# Patient Record
Sex: MALE | Race: WHITE | Employment: UNEMPLOYED | URBAN - METROPOLITAN AREA
[De-identification: names, ages, dates, MRNs, and addresses within clinical notes are randomized per-mention and may not be internally consistent; named-entity substitution may affect disease eponyms.]

---

## 2021-12-18 ENCOUNTER — HOSPITAL ENCOUNTER (INPATIENT)
Age: 56
LOS: 4 days | Discharge: HOME | DRG: 215 | End: 2021-12-23
Attending: EMERGENCY MEDICINE | Admitting: INTERNAL MEDICINE

## 2021-12-18 DIAGNOSIS — R11.2 NON-INTRACTABLE VOMITING WITH NAUSEA, UNSPECIFIED VOMITING TYPE: ICD-10-CM

## 2021-12-18 DIAGNOSIS — R06.02 SHORTNESS OF BREATH: ICD-10-CM

## 2021-12-18 DIAGNOSIS — I21.3 ST ELEVATION MYOCARDIAL INFARCTION (STEMI), UNSPECIFIED ARTERY (HCC): Primary | ICD-10-CM

## 2021-12-18 DIAGNOSIS — R10.13 ABDOMINAL PAIN, EPIGASTRIC: ICD-10-CM

## 2021-12-18 DIAGNOSIS — R07.9 CHEST PAIN, UNSPECIFIED TYPE: ICD-10-CM

## 2021-12-18 PROCEDURE — 99285 EMERGENCY DEPT VISIT HI MDM: CPT

## 2021-12-19 ENCOUNTER — APPOINTMENT (OUTPATIENT)
Dept: GENERAL RADIOLOGY | Age: 56
DRG: 215 | End: 2021-12-19

## 2021-12-19 PROBLEM — I21.3 ST ELEVATION MYOCARDIAL INFARCTION (STEMI) (HCC): Status: ACTIVE | Noted: 2021-12-19

## 2021-12-19 PROBLEM — I21.4 NSTEMI (NON-ST ELEVATED MYOCARDIAL INFARCTION) (HCC): Status: ACTIVE | Noted: 2021-12-19

## 2021-12-19 PROBLEM — R57.0 CARDIOGENIC SHOCK (HCC): Status: ACTIVE | Noted: 2021-12-19

## 2021-12-19 LAB
A/G RATIO: 1.1 (ref 1.1–2.2)
ALBUMIN SERPL-MCNC: 3.1 G/DL (ref 3.4–5)
ALP BLD-CCNC: 44 U/L (ref 40–129)
ALT SERPL-CCNC: 75 U/L (ref 10–40)
ANION GAP SERPL CALCULATED.3IONS-SCNC: 12 MMOL/L (ref 3–16)
ANION GAP SERPL CALCULATED.3IONS-SCNC: 17 MMOL/L (ref 3–16)
AST SERPL-CCNC: 239 U/L (ref 15–37)
BANDED NEUTROPHILS RELATIVE PERCENT: 2 % (ref 0–7)
BASOPHILS ABSOLUTE: 0 K/UL (ref 0–0.2)
BASOPHILS RELATIVE PERCENT: 0 %
BILIRUB SERPL-MCNC: 0.7 MG/DL (ref 0–1)
BUN BLDV-MCNC: 28 MG/DL (ref 7–20)
BUN BLDV-MCNC: 29 MG/DL (ref 7–20)
CALCIUM SERPL-MCNC: 8.1 MG/DL (ref 8.3–10.6)
CALCIUM SERPL-MCNC: 8.8 MG/DL (ref 8.3–10.6)
CHLORIDE BLD-SCNC: 93 MMOL/L (ref 99–110)
CHLORIDE BLD-SCNC: 97 MMOL/L (ref 99–110)
CO2: 24 MMOL/L (ref 21–32)
CO2: 26 MMOL/L (ref 21–32)
CREAT SERPL-MCNC: 1.2 MG/DL (ref 0.9–1.3)
CREAT SERPL-MCNC: 1.3 MG/DL (ref 0.9–1.3)
EKG ATRIAL RATE: 53 BPM
EKG DIAGNOSIS: NORMAL
EKG P AXIS: 58 DEGREES
EKG Q-T INTERVAL: 544 MS
EKG QRS DURATION: 150 MS
EKG QTC CALCULATION (BAZETT): 510 MS
EKG R AXIS: 196 DEGREES
EKG T AXIS: 79 DEGREES
EKG VENTRICULAR RATE: 53 BPM
EOSINOPHILS ABSOLUTE: 0 K/UL (ref 0–0.6)
EOSINOPHILS RELATIVE PERCENT: 0 %
GFR AFRICAN AMERICAN: >60
GFR AFRICAN AMERICAN: >60
GFR NON-AFRICAN AMERICAN: 57
GFR NON-AFRICAN AMERICAN: >60
GLUCOSE BLD-MCNC: 126 MG/DL (ref 70–99)
GLUCOSE BLD-MCNC: 131 MG/DL (ref 70–99)
HCT VFR BLD CALC: 37.9 % (ref 40.5–52.5)
HCT VFR BLD CALC: 43.1 % (ref 40.5–52.5)
HEMATOLOGY PATH CONSULT: YES
HEMOGLOBIN: 12.8 G/DL (ref 13.5–17.5)
HEMOGLOBIN: 14.5 G/DL (ref 13.5–17.5)
LACTIC ACID, SEPSIS: 1.3 MMOL/L (ref 0.4–1.9)
LACTIC ACID, SEPSIS: 1.5 MMOL/L (ref 0.4–1.9)
LACTIC ACID: 1.1 MMOL/L (ref 0.4–2)
LYMPHOCYTES ABSOLUTE: 1 K/UL (ref 1–5.1)
LYMPHOCYTES RELATIVE PERCENT: 5 %
MAGNESIUM: 2.3 MG/DL (ref 1.8–2.4)
MCH RBC QN AUTO: 31.2 PG (ref 26–34)
MCH RBC QN AUTO: 31.5 PG (ref 26–34)
MCHC RBC AUTO-ENTMCNC: 33.7 G/DL (ref 31–36)
MCHC RBC AUTO-ENTMCNC: 33.7 G/DL (ref 31–36)
MCV RBC AUTO: 92.7 FL (ref 80–100)
MCV RBC AUTO: 93.4 FL (ref 80–100)
MONOCYTES ABSOLUTE: 2.5 K/UL (ref 0–1.3)
MONOCYTES RELATIVE PERCENT: 13 %
NEUTROPHILS ABSOLUTE: 15.9 K/UL (ref 1.7–7.7)
NEUTROPHILS RELATIVE PERCENT: 80 %
PDW BLD-RTO: 14 % (ref 12.4–15.4)
PDW BLD-RTO: 14 % (ref 12.4–15.4)
PHOSPHORUS: 3.1 MG/DL (ref 2.5–4.9)
PLATELET # BLD: 214 K/UL (ref 135–450)
PLATELET # BLD: 254 K/UL (ref 135–450)
PLATELET SLIDE REVIEW: ADEQUATE
PMV BLD AUTO: 8.2 FL (ref 5–10.5)
PMV BLD AUTO: 8.7 FL (ref 5–10.5)
POC ACT LR: 145 SEC
POC ACT LR: 145 SEC
POC ACT LR: 147 SEC
POC ACT LR: 149 SEC
POC ACT LR: 150 SEC
POC ACT LR: 150 SEC
POC ACT LR: 155 SEC
POC ACT LR: 157 SEC
POC ACT LR: 159 SEC
POC ACT LR: 159 SEC
POC ACT LR: 160 SEC
POC ACT LR: 164 SEC
POC ACT LR: 167 SEC
POC ACT LR: 167 SEC
POC ACT LR: 175 SEC
POC ACT LR: 178 SEC
POC ACT LR: 188 SEC
POC ACT LR: 300 SEC
POTASSIUM REFLEX MAGNESIUM: 4.2 MMOL/L (ref 3.5–5.1)
POTASSIUM SERPL-SCNC: 3.5 MMOL/L (ref 3.5–5.1)
PRO-BNP: 3039 PG/ML (ref 0–124)
RBC # BLD: 4.05 M/UL (ref 4.2–5.9)
RBC # BLD: 4.66 M/UL (ref 4.2–5.9)
SARS-COV-2, NAAT: NOT DETECTED
SODIUM BLD-SCNC: 134 MMOL/L (ref 136–145)
SODIUM BLD-SCNC: 135 MMOL/L (ref 136–145)
TOTAL PROTEIN: 5.8 G/DL (ref 6.4–8.2)
TROPONIN: 3.17 NG/ML
TROPONIN: 4.36 NG/ML
TROPONIN: 5.48 NG/ML
WBC # BLD: 16.9 K/UL (ref 4–11)
WBC # BLD: 19.4 K/UL (ref 4–11)

## 2021-12-19 PROCEDURE — 96374 THER/PROPH/DIAG INJ IV PUSH: CPT

## 2021-12-19 PROCEDURE — C1894 INTRO/SHEATH, NON-LASER: HCPCS

## 2021-12-19 PROCEDURE — 36415 COLL VENOUS BLD VENIPUNCTURE: CPT

## 2021-12-19 PROCEDURE — 99152 MOD SED SAME PHYS/QHP 5/>YRS: CPT | Performed by: INTERNAL MEDICINE

## 2021-12-19 PROCEDURE — 6360000002 HC RX W HCPCS: Performed by: EMERGENCY MEDICINE

## 2021-12-19 PROCEDURE — 83880 ASSAY OF NATRIURETIC PEPTIDE: CPT

## 2021-12-19 PROCEDURE — C1887 CATHETER, GUIDING: HCPCS

## 2021-12-19 PROCEDURE — 2709999900 HC NON-CHARGEABLE SUPPLY

## 2021-12-19 PROCEDURE — 5A1223Z PERFORMANCE OF CARDIAC PACING, CONTINUOUS: ICD-10-PCS | Performed by: INTERNAL MEDICINE

## 2021-12-19 PROCEDURE — 80053 COMPREHEN METABOLIC PANEL: CPT

## 2021-12-19 PROCEDURE — 93005 ELECTROCARDIOGRAM TRACING: CPT | Performed by: EMERGENCY MEDICINE

## 2021-12-19 PROCEDURE — 33967 INSERT I-AORT PERCUT DEVICE: CPT

## 2021-12-19 PROCEDURE — C1889 IMPLANT/INSERT DEVICE, NOC: HCPCS

## 2021-12-19 PROCEDURE — 2580000003 HC RX 258: Performed by: INTERNAL MEDICINE

## 2021-12-19 PROCEDURE — 6360000004 HC RX CONTRAST MEDICATION: Performed by: INTERNAL MEDICINE

## 2021-12-19 PROCEDURE — 2100000000 HC CCU R&B

## 2021-12-19 PROCEDURE — 93458 L HRT ARTERY/VENTRICLE ANGIO: CPT | Performed by: INTERNAL MEDICINE

## 2021-12-19 PROCEDURE — 4A023N7 MEASUREMENT OF CARDIAC SAMPLING AND PRESSURE, LEFT HEART, PERCUTANEOUS APPROACH: ICD-10-PCS | Performed by: INTERNAL MEDICINE

## 2021-12-19 PROCEDURE — 94761 N-INVAS EAR/PLS OXIMETRY MLT: CPT

## 2021-12-19 PROCEDURE — 84100 ASSAY OF PHOSPHORUS: CPT

## 2021-12-19 PROCEDURE — 33990 INSJ PERQ VAD L HRT ARTERIAL: CPT | Performed by: INTERNAL MEDICINE

## 2021-12-19 PROCEDURE — 71045 X-RAY EXAM CHEST 1 VIEW: CPT

## 2021-12-19 PROCEDURE — 6370000000 HC RX 637 (ALT 250 FOR IP): Performed by: EMERGENCY MEDICINE

## 2021-12-19 PROCEDURE — 85027 COMPLETE CBC AUTOMATED: CPT

## 2021-12-19 PROCEDURE — 83605 ASSAY OF LACTIC ACID: CPT

## 2021-12-19 PROCEDURE — 51702 INSERT TEMP BLADDER CATH: CPT

## 2021-12-19 PROCEDURE — B2111ZZ FLUOROSCOPY OF MULTIPLE CORONARY ARTERIES USING LOW OSMOLAR CONTRAST: ICD-10-PCS | Performed by: INTERNAL MEDICINE

## 2021-12-19 PROCEDURE — 92941 PRQ TRLML REVSC TOT OCCL AMI: CPT | Performed by: INTERNAL MEDICINE

## 2021-12-19 PROCEDURE — 2720000010 HC SURG SUPPLY STERILE

## 2021-12-19 PROCEDURE — 2500000003 HC RX 250 WO HCPCS

## 2021-12-19 PROCEDURE — 33990 INSJ PERQ VAD L HRT ARTERIAL: CPT

## 2021-12-19 PROCEDURE — B2151ZZ FLUOROSCOPY OF LEFT HEART USING LOW OSMOLAR CONTRAST: ICD-10-PCS | Performed by: INTERNAL MEDICINE

## 2021-12-19 PROCEDURE — 93458 L HRT ARTERY/VENTRICLE ANGIO: CPT

## 2021-12-19 PROCEDURE — 99291 CRITICAL CARE FIRST HOUR: CPT | Performed by: INTERNAL MEDICINE

## 2021-12-19 PROCEDURE — 93010 ELECTROCARDIOGRAM REPORT: CPT | Performed by: INTERNAL MEDICINE

## 2021-12-19 PROCEDURE — 93005 ELECTROCARDIOGRAM TRACING: CPT | Performed by: INTERNAL MEDICINE

## 2021-12-19 PROCEDURE — 85347 COAGULATION TIME ACTIVATED: CPT

## 2021-12-19 PROCEDURE — 83735 ASSAY OF MAGNESIUM: CPT

## 2021-12-19 PROCEDURE — 6360000002 HC RX W HCPCS

## 2021-12-19 PROCEDURE — 6360000002 HC RX W HCPCS: Performed by: INTERNAL MEDICINE

## 2021-12-19 PROCEDURE — 33974 REMOVE INTRA-AORTIC BALLOON: CPT | Performed by: INTERNAL MEDICINE

## 2021-12-19 PROCEDURE — 84484 ASSAY OF TROPONIN QUANT: CPT

## 2021-12-19 PROCEDURE — 99152 MOD SED SAME PHYS/QHP 5/>YRS: CPT

## 2021-12-19 PROCEDURE — 5A0221D ASSISTANCE WITH CARDIAC OUTPUT USING IMPELLER PUMP, CONTINUOUS: ICD-10-PCS | Performed by: INTERNAL MEDICINE

## 2021-12-19 PROCEDURE — 87040 BLOOD CULTURE FOR BACTERIA: CPT

## 2021-12-19 PROCEDURE — 92973 PRQ TRLUML C MCHN ASP THRMBC: CPT

## 2021-12-19 PROCEDURE — 02HA3RZ INSERTION OF SHORT-TERM EXTERNAL HEART ASSIST SYSTEM INTO HEART, PERCUTANEOUS APPROACH: ICD-10-PCS | Performed by: INTERNAL MEDICINE

## 2021-12-19 PROCEDURE — 85025 COMPLETE CBC W/AUTO DIFF WBC: CPT

## 2021-12-19 PROCEDURE — 87635 SARS-COV-2 COVID-19 AMP PRB: CPT

## 2021-12-19 PROCEDURE — 33973 INSERT BALLOON DEVICE: CPT | Performed by: INTERNAL MEDICINE

## 2021-12-19 PROCEDURE — 2580000003 HC RX 258: Performed by: EMERGENCY MEDICINE

## 2021-12-19 PROCEDURE — C1769 GUIDE WIRE: HCPCS

## 2021-12-19 PROCEDURE — 2500000003 HC RX 250 WO HCPCS: Performed by: INTERNAL MEDICINE

## 2021-12-19 PROCEDURE — C1725 CATH, TRANSLUMIN NON-LASER: HCPCS

## 2021-12-19 PROCEDURE — 93308 TTE F-UP OR LMTD: CPT

## 2021-12-19 PROCEDURE — 92941 PRQ TRLML REVSC TOT OCCL AMI: CPT

## 2021-12-19 PROCEDURE — 6370000000 HC RX 637 (ALT 250 FOR IP): Performed by: INTERNAL MEDICINE

## 2021-12-19 PROCEDURE — 96361 HYDRATE IV INFUSION ADD-ON: CPT

## 2021-12-19 PROCEDURE — 99153 MOD SED SAME PHYS/QHP EA: CPT

## 2021-12-19 RX ORDER — SODIUM CHLORIDE 9 MG/ML
25 INJECTION, SOLUTION INTRAVENOUS PRN
Status: DISCONTINUED | OUTPATIENT
Start: 2021-12-19 | End: 2021-12-21

## 2021-12-19 RX ORDER — LORAZEPAM 2 MG/ML
1 INJECTION INTRAMUSCULAR EVERY 4 HOURS PRN
Status: DISCONTINUED | OUTPATIENT
Start: 2021-12-19 | End: 2021-12-23 | Stop reason: HOSPADM

## 2021-12-19 RX ORDER — SODIUM CHLORIDE 9 MG/ML
INJECTION, SOLUTION INTRAVENOUS CONTINUOUS
Status: DISCONTINUED | OUTPATIENT
Start: 2021-12-19 | End: 2021-12-23 | Stop reason: HOSPADM

## 2021-12-19 RX ORDER — 0.9 % SODIUM CHLORIDE 0.9 %
500 INTRAVENOUS SOLUTION INTRAVENOUS ONCE
Status: COMPLETED | OUTPATIENT
Start: 2021-12-19 | End: 2021-12-19

## 2021-12-19 RX ORDER — ASPIRIN 81 MG/1
324 TABLET, CHEWABLE ORAL ONCE
Status: COMPLETED | OUTPATIENT
Start: 2021-12-19 | End: 2021-12-19

## 2021-12-19 RX ORDER — 0.9 % SODIUM CHLORIDE 0.9 %
1000 INTRAVENOUS SOLUTION INTRAVENOUS ONCE
Status: COMPLETED | OUTPATIENT
Start: 2021-12-19 | End: 2021-12-19

## 2021-12-19 RX ORDER — HEPARIN SODIUM 1000 [USP'U]/ML
1000 INJECTION, SOLUTION INTRAVENOUS; SUBCUTANEOUS ONCE
Status: COMPLETED | OUTPATIENT
Start: 2021-12-19 | End: 2021-12-19

## 2021-12-19 RX ORDER — HYDROCODONE BITARTRATE AND ACETAMINOPHEN 5; 325 MG/1; MG/1
2 TABLET ORAL EVERY 4 HOURS PRN
Status: DISCONTINUED | OUTPATIENT
Start: 2021-12-19 | End: 2021-12-23 | Stop reason: HOSPADM

## 2021-12-19 RX ORDER — HEPARIN SODIUM 10000 [USP'U]/100ML
0-3000 INJECTION, SOLUTION INTRAVENOUS CONTINUOUS
Status: DISCONTINUED | OUTPATIENT
Start: 2021-12-19 | End: 2021-12-22

## 2021-12-19 RX ORDER — HEPARIN SODIUM 1000 [USP'U]/ML
6000 INJECTION, SOLUTION INTRAVENOUS; SUBCUTANEOUS ONCE
Status: COMPLETED | OUTPATIENT
Start: 2021-12-19 | End: 2021-12-19

## 2021-12-19 RX ORDER — MORPHINE SULFATE 4 MG/ML
4 INJECTION, SOLUTION INTRAMUSCULAR; INTRAVENOUS
Status: DISCONTINUED | OUTPATIENT
Start: 2021-12-19 | End: 2021-12-23 | Stop reason: HOSPADM

## 2021-12-19 RX ORDER — SODIUM CHLORIDE 0.9 % (FLUSH) 0.9 %
5-40 SYRINGE (ML) INJECTION PRN
Status: DISCONTINUED | OUTPATIENT
Start: 2021-12-19 | End: 2021-12-21

## 2021-12-19 RX ORDER — SODIUM CHLORIDE 0.9 % (FLUSH) 0.9 %
5-40 SYRINGE (ML) INJECTION EVERY 12 HOURS SCHEDULED
Status: DISCONTINUED | OUTPATIENT
Start: 2021-12-19 | End: 2021-12-21

## 2021-12-19 RX ORDER — ONDANSETRON 2 MG/ML
4 INJECTION INTRAMUSCULAR; INTRAVENOUS EVERY 6 HOURS PRN
Status: DISCONTINUED | OUTPATIENT
Start: 2021-12-19 | End: 2021-12-23 | Stop reason: HOSPADM

## 2021-12-19 RX ORDER — ACETAMINOPHEN 325 MG/1
650 TABLET ORAL EVERY 4 HOURS PRN
Status: DISCONTINUED | OUTPATIENT
Start: 2021-12-19 | End: 2021-12-21

## 2021-12-19 RX ORDER — LISINOPRIL 20 MG/1
20 TABLET ORAL DAILY
Status: ON HOLD | COMMUNITY
End: 2021-12-23 | Stop reason: HOSPADM

## 2021-12-19 RX ORDER — MORPHINE SULFATE 2 MG/ML
2 INJECTION, SOLUTION INTRAMUSCULAR; INTRAVENOUS
Status: ACTIVE | OUTPATIENT
Start: 2021-12-19 | End: 2021-12-19

## 2021-12-19 RX ORDER — MIDAZOLAM HYDROCHLORIDE 1 MG/ML
2 INJECTION INTRAMUSCULAR; INTRAVENOUS
Status: ACTIVE | OUTPATIENT
Start: 2021-12-19 | End: 2021-12-19

## 2021-12-19 RX ORDER — EPTIFIBATIDE 0.75 MG/ML
2 INJECTION, SOLUTION INTRAVENOUS CONTINUOUS
Status: DISPENSED | OUTPATIENT
Start: 2021-12-19 | End: 2021-12-20

## 2021-12-19 RX ORDER — ATROPINE SULFATE 0.4 MG/ML
0.5 AMPUL (ML) INJECTION
Status: ACTIVE | OUTPATIENT
Start: 2021-12-19 | End: 2021-12-19

## 2021-12-19 RX ORDER — 0.9 % SODIUM CHLORIDE 0.9 %
500 INTRAVENOUS SOLUTION INTRAVENOUS PRN
Status: DISCONTINUED | OUTPATIENT
Start: 2021-12-19 | End: 2021-12-23 | Stop reason: HOSPADM

## 2021-12-19 RX ORDER — ATORVASTATIN CALCIUM 40 MG/1
40 TABLET, FILM COATED ORAL NIGHTLY
Status: DISCONTINUED | OUTPATIENT
Start: 2021-12-19 | End: 2021-12-23 | Stop reason: HOSPADM

## 2021-12-19 RX ORDER — POTASSIUM CHLORIDE 750 MG/1
40 TABLET, FILM COATED, EXTENDED RELEASE ORAL 2 TIMES DAILY
Status: DISCONTINUED | OUTPATIENT
Start: 2021-12-19 | End: 2021-12-23 | Stop reason: HOSPADM

## 2021-12-19 RX ORDER — HYDROCODONE BITARTRATE AND ACETAMINOPHEN 5; 325 MG/1; MG/1
1 TABLET ORAL EVERY 4 HOURS PRN
Status: DISCONTINUED | OUTPATIENT
Start: 2021-12-19 | End: 2021-12-23 | Stop reason: HOSPADM

## 2021-12-19 RX ORDER — HYDROCHLOROTHIAZIDE 25 MG/1
25 TABLET ORAL DAILY
Status: ON HOLD | COMMUNITY
End: 2021-12-23 | Stop reason: HOSPADM

## 2021-12-19 RX ORDER — EPTIFIBATIDE 0.75 MG/ML
2 INJECTION, SOLUTION INTRAVENOUS CONTINUOUS
Status: DISPENSED | OUTPATIENT
Start: 2021-12-19 | End: 2021-12-19

## 2021-12-19 RX ORDER — ASPIRIN 81 MG/1
81 TABLET, CHEWABLE ORAL DAILY
Status: DISCONTINUED | OUTPATIENT
Start: 2021-12-20 | End: 2021-12-23 | Stop reason: HOSPADM

## 2021-12-19 RX ORDER — FENTANYL CITRATE 50 UG/ML
25 INJECTION, SOLUTION INTRAMUSCULAR; INTRAVENOUS
Status: ACTIVE | OUTPATIENT
Start: 2021-12-19 | End: 2021-12-19

## 2021-12-19 RX ADMIN — HYDROCODONE BITARTRATE AND ACETAMINOPHEN 2 TABLET: 5; 325 TABLET ORAL at 18:35

## 2021-12-19 RX ADMIN — SODIUM CHLORIDE: 9 INJECTION, SOLUTION INTRAVENOUS at 03:45

## 2021-12-19 RX ADMIN — ATORVASTATIN CALCIUM 40 MG: 40 TABLET, FILM COATED ORAL at 20:32

## 2021-12-19 RX ADMIN — TICAGRELOR 90 MG: 90 TABLET ORAL at 15:24

## 2021-12-19 RX ADMIN — EPTIFIBATIDE 2 MCG/KG/MIN: 75 INJECTION INTRAVENOUS at 22:57

## 2021-12-19 RX ADMIN — LORAZEPAM 1 MG: 2 INJECTION INTRAMUSCULAR; INTRAVENOUS at 23:04

## 2021-12-19 RX ADMIN — Medication 10 ML: at 20:30

## 2021-12-19 RX ADMIN — POTASSIUM CHLORIDE 40 MEQ: 750 TABLET, FILM COATED, EXTENDED RELEASE ORAL at 15:24

## 2021-12-19 RX ADMIN — Medication 5 MCG/MIN: at 03:50

## 2021-12-19 RX ADMIN — HEPARIN SODIUM: 5000 INJECTION INTRAVENOUS; SUBCUTANEOUS at 04:26

## 2021-12-19 RX ADMIN — HEPARIN SODIUM: 5000 INJECTION INTRAVENOUS; SUBCUTANEOUS at 17:33

## 2021-12-19 RX ADMIN — MORPHINE SULFATE 4 MG: 4 INJECTION, SOLUTION INTRAMUSCULAR; INTRAVENOUS at 07:40

## 2021-12-19 RX ADMIN — TICAGRELOR 90 MG: 90 TABLET ORAL at 20:33

## 2021-12-19 RX ADMIN — ASPIRIN 81 MG 324 MG: 81 TABLET ORAL at 00:48

## 2021-12-19 RX ADMIN — LORAZEPAM 1 MG: 2 INJECTION INTRAMUSCULAR; INTRAVENOUS at 04:16

## 2021-12-19 RX ADMIN — Medication 10 ML: at 11:21

## 2021-12-19 RX ADMIN — EPTIFIBATIDE 2 MCG/KG/MIN: 75 INJECTION INTRAVENOUS at 06:36

## 2021-12-19 RX ADMIN — HYDROCODONE BITARTRATE AND ACETAMINOPHEN 2 TABLET: 5; 325 TABLET ORAL at 09:14

## 2021-12-19 RX ADMIN — EPTIFIBATIDE 2 MCG/KG/MIN: 75 INJECTION INTRAVENOUS at 03:47

## 2021-12-19 RX ADMIN — MORPHINE SULFATE 4 MG: 4 INJECTION, SOLUTION INTRAMUSCULAR; INTRAVENOUS at 21:05

## 2021-12-19 RX ADMIN — SODIUM CHLORIDE 1000 ML: 9 INJECTION, SOLUTION INTRAVENOUS at 00:21

## 2021-12-19 RX ADMIN — MORPHINE SULFATE 4 MG: 4 INJECTION, SOLUTION INTRAMUSCULAR; INTRAVENOUS at 19:02

## 2021-12-19 RX ADMIN — SODIUM CHLORIDE 500 ML: 9 INJECTION, SOLUTION INTRAVENOUS at 03:15

## 2021-12-19 RX ADMIN — HEPARIN SODIUM 1000 UNITS: 1000 INJECTION INTRAVENOUS; SUBCUTANEOUS at 06:10

## 2021-12-19 RX ADMIN — MORPHINE SULFATE 4 MG: 4 INJECTION, SOLUTION INTRAMUSCULAR; INTRAVENOUS at 11:21

## 2021-12-19 RX ADMIN — HEPARIN SODIUM 300 UNITS/HR: 5000 INJECTION INTRAVENOUS; SUBCUTANEOUS at 05:18

## 2021-12-19 RX ADMIN — IOPAMIDOL 65 ML: 755 INJECTION, SOLUTION INTRAVENOUS at 02:29

## 2021-12-19 RX ADMIN — HYDROCODONE BITARTRATE AND ACETAMINOPHEN 2 TABLET: 5; 325 TABLET ORAL at 14:38

## 2021-12-19 RX ADMIN — POTASSIUM CHLORIDE 40 MEQ: 750 TABLET, FILM COATED, EXTENDED RELEASE ORAL at 20:32

## 2021-12-19 RX ADMIN — SODIUM CHLORIDE: 9 INJECTION, SOLUTION INTRAVENOUS at 18:36

## 2021-12-19 RX ADMIN — HEPARIN SODIUM 6000 UNITS: 1000 INJECTION INTRAVENOUS; SUBCUTANEOUS at 00:45

## 2021-12-19 RX ADMIN — MORPHINE SULFATE 4 MG: 4 INJECTION, SOLUTION INTRAMUSCULAR; INTRAVENOUS at 13:34

## 2021-12-19 RX ADMIN — SODIUM CHLORIDE: 9 INJECTION, SOLUTION INTRAVENOUS at 22:49

## 2021-12-19 RX ADMIN — SODIUM CHLORIDE: 9 INJECTION, SOLUTION INTRAVENOUS at 06:34

## 2021-12-19 RX ADMIN — MORPHINE SULFATE 4 MG: 4 INJECTION, SOLUTION INTRAMUSCULAR; INTRAVENOUS at 03:40

## 2021-12-19 ASSESSMENT — PAIN DESCRIPTION - DESCRIPTORS
DESCRIPTORS: ACHING
DESCRIPTORS: ACHING
DESCRIPTORS: ACHING;RADIATING

## 2021-12-19 ASSESSMENT — PAIN SCALES - GENERAL
PAINLEVEL_OUTOF10: 8
PAINLEVEL_OUTOF10: 10
PAINLEVEL_OUTOF10: 8
PAINLEVEL_OUTOF10: 6
PAINLEVEL_OUTOF10: 8
PAINLEVEL_OUTOF10: 9
PAINLEVEL_OUTOF10: 10
PAINLEVEL_OUTOF10: 6
PAINLEVEL_OUTOF10: 10
PAINLEVEL_OUTOF10: 7
PAINLEVEL_OUTOF10: 10
PAINLEVEL_OUTOF10: 7
PAINLEVEL_OUTOF10: 8
PAINLEVEL_OUTOF10: 7
PAINLEVEL_OUTOF10: 8

## 2021-12-19 ASSESSMENT — PAIN DESCRIPTION - PAIN TYPE
TYPE: ACUTE PAIN

## 2021-12-19 ASSESSMENT — PAIN DESCRIPTION - ONSET
ONSET: ON-GOING
ONSET: ON-GOING

## 2021-12-19 ASSESSMENT — PAIN - FUNCTIONAL ASSESSMENT
PAIN_FUNCTIONAL_ASSESSMENT: PREVENTS OR INTERFERES WITH ALL ACTIVE AND SOME PASSIVE ACTIVITIES
PAIN_FUNCTIONAL_ASSESSMENT: ACTIVITIES ARE NOT PREVENTED

## 2021-12-19 ASSESSMENT — PAIN DESCRIPTION - LOCATION
LOCATION: BACK
LOCATION: CHEST;SHOULDER;BACK
LOCATION: BACK

## 2021-12-19 ASSESSMENT — PAIN DESCRIPTION - PROGRESSION
CLINICAL_PROGRESSION: GRADUALLY WORSENING
CLINICAL_PROGRESSION: NOT CHANGED

## 2021-12-19 ASSESSMENT — PAIN DESCRIPTION - FREQUENCY
FREQUENCY: CONTINUOUS
FREQUENCY: CONTINUOUS

## 2021-12-19 ASSESSMENT — PAIN DESCRIPTION - ORIENTATION: ORIENTATION: LEFT;RIGHT;ANTERIOR;MID

## 2021-12-19 NOTE — PROGRESS NOTES
Clinical Pharmacy Note  Heparin Dosing - Impella Device    Patient receiving heparin purge solution via Impella device. Consult received from Dr. Christopher Earl to titrate systemic heparin to maintain -180 . Shift ACT Results and Heparin Adjustments:      Date   Time POC ACT  Result Heparin  Bolus   (units) Systemic Heparin Infusion Rate (100 units/mL)   12/19/21   0800     178                           450 units/hr       0900     167       450 units/hr      1000     155       500 units/hr       1100     167       500 units/hr      1300     157        500 units/hr      1400     150       550 units/hr       Pharmacy will continue to monitor and adjust based on ACT results.     Helene Mccallum, PharmD, BCPS  12/19/2021  2:22 PM

## 2021-12-19 NOTE — PROGRESS NOTES
5am ACT was 164. Discussed with Barb Guardian in pharmacy and systemic heparin started  at 300 unit per hr.   Will continue hourly ACTs

## 2021-12-19 NOTE — ED NOTES
Bed: E-40  Expected date: 12/18/21  Expected time: 11:49 PM  Means of arrival: [facility]  Comments:  1140 \[facility] [place] [facility]\""  12/18/21 6933

## 2021-12-19 NOTE — PROGRESS NOTES
No response from Dr. Chhaya Collado regarding plan for patient for today, or request for swan. Message resent. Awaiting response.

## 2021-12-19 NOTE — PROGRESS NOTES
Return call from Dr. Luis Alva. Instructed to half rate of integrillin today, for plan of removal of IABP. States he plans to place swan, and will be in the hospital at around 12 pm today. Also instructed to lower the rate of the pacemaker to 80.

## 2021-12-19 NOTE — PROGRESS NOTES
Call placed to [facility] representative per request of Dr. Elizabet Zabala, to see if echo is needed today. josiah Garcia states yes. Echo tech on call notified. States she will be here in an hour. Josaih rep states he will be in the building in an hour.

## 2021-12-19 NOTE — PROGRESS NOTES
Late entry due to providing care: At 1200, Dr. Elizabet Zabala was bedside to speak with the family and this RN was instructed to prepare for IABP pull. Was unable to obtain ACT and impella numbers at this time. Will obtain at 1300. At 1225, IABP was removed by Dr. Elizabet Zabala. Manual pressure was assumed by this RN, with second RN checking pulses every 5 minutes. At 1255, manual hold of pressure over balloon pump site discontinued. Site soft. Scant oozing. Will continue to monitor per protocol. At 1330, Impella representative Jose at bedside; dressing over impella site changed with Impella Rep. Site no longer appears to be oozing. Will monitor. At 1429, instructed to return integrillin to former dose.

## 2021-12-19 NOTE — ED TRIAGE NOTES
Patient states he has not been feeling well for days, states he has had constant chest pain and SOB. States the chest pain is in his mid chest and radiates to his back and up to his shoulder. States he has sharp pain with inspiration. Patient states he tested negative for Covid yesterday.

## 2021-12-19 NOTE — PRE SEDATION
Sedation Pre-Procedure Note    Patient Name: Balwinder Mosquera   YOB: 1965  Room/Bed: P5M-8628/1305-01  Medical Record Number: [id number]  Date: 12/19/2021   Time: 2:58 AM       Indication: Cardiogenic shock/complete heart block    Consent: 21-year-old patient who presented 24 hours after severe prolonged chest pain associate with nausea vomiting diaphoresis. His EKG shows complete heart block and a completed AB. And anterolateral apical infarct    Vital Signs:   Vitals:    12/19/21 0102   BP: (!) 86/50   Pulse: 56   Resp: 18   Temp:    SpO2: 100%       Past Medical History:   has a past medical history of Hypertension. Past Surgical History:   has no past surgical history on file. Medications:   Scheduled Meds:   Continuous Infusions:   PRN Meds: LORazepam  Home Meds:   Prior to Admission medications    Medication Sig Start Date End Date Taking? Authorizing Provider   hydroCHLOROthiazide (HYDRODIURIL) 25 MG tablet Take 25 mg by mouth daily   Yes Historical Provider, MD   lisinopril (PRINIVIL;ZESTRIL) 20 MG tablet Take 20 mg by mouth daily   Yes Historical Provider, MD     Coumadin Use Last 7 Days:  no  Antiplatelet drug therapy use last 7 days: no  Other anticoagulant use last 7 days: no  Additional Medication Information:        Pre-Sedation Documentation and Exam:   I have personally completed a history, physical exam & review of systems for this patient (see notes).     Mallampati Airway Assessment:  normal    Prior History of Anesthesia Complications:   none    ASA Classification:  Class 1 - A normal healthy patient    Sedation/ Anesthesia Plan:   intravenous sedation    Medications Planned:   midazolam (Versed) intravenously and fentanyl intravenously    Patient is an appropriate candidate for plan of sedation: yes    Electronically signed by Leo Shin MD on 12/19/2021 at 2:58 AM

## 2021-12-19 NOTE — ED NOTES
Report given to [facility] in the cathlab, pt transported without incident     Miah Benítez RN  12/19/21 0127

## 2021-12-19 NOTE — PROGRESS NOTES
Message sent to Dr. Haresh Saldivar to inquire about swan placement today and plan of care. Awaiting response.

## 2021-12-19 NOTE — PROGRESS NOTES
Order for integrillin technically ended. Per Dr. Bashir Nagy note, states to continue it until other wise told to stop medication. Order clarified with physician.

## 2021-12-19 NOTE — ED PROVIDER NOTES
CHIEF COMPLAINT  Shortness of Breath (sharp pain with breath) and Chest Pain (Pain 10/10 for days)      HISTORY OF PRESENT ILLNESS  Balwinder Burciaga is a 55 y.o. male who presents to the ED complaining of epigastric and substernal chest pain has been present since yesterday. Patient states he is also had nausea and vomiting that started yesterday. States pain is 10 out of 10 and gets worse with deep breathing. Denies any relieving factors. States he has not had any nausea or vomiting for the past 4 hours. States he does feel short of breath. No previous cardiac history. No recent travel. States he was seen in urgent care yesterday for the nausea and vomiting and treated with a dose of IM Phenergan and was discharged. No tobacco use. No illicit drug use. No alcohol use. .   No other complaints, modifying factors or associated symptoms. Nursing notes reviewed. Denies any past medical history  Denies any past surgical history  Denies any pertinent family history  Social History     Socioeconomic History    Marital status: Not on file     Spouse name: Not on file    Number of children: Not on file    Years of education: Not on file    Highest education level: Not on file   Occupational History    Not on file   Tobacco Use    Smoking status: Not on file    Smokeless tobacco: Not on file   Substance and Sexual Activity    Alcohol use: Not on file    Drug use: Not on file    Sexual activity: Not on file   Other Topics Concern    Not on file   Social History Narrative    Not on file     Social Determinants of Health     Financial Resource Strain:     Difficulty of Paying Living Expenses: Not on file   Food Insecurity:     Worried About Running Out of Food in the Last Year: Not on file    Cecelia of Food in the Last Year: Not on file   Transportation Needs:     Lack of Transportation (Medical): Not on file    Lack of Transportation (Non-Medical):  Not on file   Physical Activity:     Days of Exercise per Week: Not on file    Minutes of Exercise per Session: Not on file   Stress:     Feeling of Stress : Not on file   Social Connections:     Frequency of Communication with Friends and Family: Not on file    Frequency of Social Gatherings with Friends and Family: Not on file    Attends Voodoo Services: Not on file    Active Member of [address] [place] [address] [employer] or Organizations: Not on file    Attends Club or Organization Meetings: Not on file    Marital Status: Not on file   Intimate Partner Violence:     Fear of Current or Ex-Partner: Not on file    Emotionally Abused: Not on file    Physically Abused: Not on file    Sexually Abused: Not on file   Housing Stability:     Unable to Pay for Housing in the Last Year: Not on file    Number of [place] in the Last Year: Not on file    Unstable Housing in the Last Year: Not on file     Current Facility-Administered Medications   Medication Dose Route Frequency Provider Last Rate Last Admin    0.9 % sodium chloride bolus  1,000 mL IntraVENous Once Johan Longoria MD 1,000 mL/hr at 12/19/21 0021 1,000 mL at 12/19/21 0021     Current Outpatient Medications   Medication Sig Dispense Refill    hydroCHLOROthiazide (HYDRODIURIL) 25 MG tablet Take 25 mg by mouth daily      lisinopril (PRINIVIL;ZESTRIL) 20 MG tablet Take 20 mg by mouth daily       No Known Allergies      REVIEW OF SYSTEMS  10 systems reviewed, pertinent positives per HPI otherwise noted to be negative    PHYSICAL EXAM  BP (!) 86/50   Pulse 56   Temp 97.6 °F (36.4 °C) (Oral)   Resp 18   Ht 6' 1\" (1.854 m)   Wt 200 lb (90.7 kg)   SpO2 100%   BMI 26.39 kg/m²      CONSTITUTIONAL: AOx4, ill-appearing, cooperative with exam, afebrile   HEAD: normocephalic, atraumatic   EYES: PERRL, EOMI, anicteric sclera   ENT: Moist mucous membranes, uvula midline   NECK: Supple, symmetric, trachea midline   LUNGS: Bilateral breath sounds, CTAB, no rales/ronchi/wheezes   CARDIOVASCULAR:  Bradycardia,  no m/r/g, 2+ pulses throughout   ABDOMEN: Soft, non-tender, non-distended, +BS   NEUROLOGIC:  MAEx4, GCS 15   MUSCULOSKELETAL: No clubbing, cyanosis or edema, no calf tenderness bilaterally   SKIN: No rash, pallor or wounds on exposed surfaces         RADIOLOGY  X-RAYS:  I have reviewed radiologic plain film image(s). ALL OTHER NON-PLAIN FILM IMAGES SUCH AS CT, ULTRASOUND AND MRI HAVE BEEN READ BY THE RADIOLOGIST. XR CHEST PORTABLE    (Results Pending)          EKG INTERPRETATION  Sinus bradycardia with complete heart block and a rate of 51, nonspecific  intraventricular block, , QTc 482, ST elevations V3 through V6 with inverted T waves, ST elevations inferior leads, Q wave anterior lateral leads patient is EKG consistent with STEMI. PROCEDURES    ED COURSE/MDM  STEMI, ACS/MI  Patient seen and evaluated. History and physical as above. Nontoxic, afebrile. Patient presents complaining of chest pain and nausea and vomiting. Patient's initial EKG concerning for STEMI. Please see ED course below. ED Course as of 12/19/21 0115   Sun Dec 19, 2021   0029 Talk with Dr. Angelika Shaw, cardiologist regarding patient's EKG and concern for STEMI. Dr. Angelika Shaw states that his EKG appears that patient has likely completed a myocardial infarction and it appears the patient is in complete heart block. Patient placed on cardiac pads. Awaiting to hear whether or not patient will be taken to the Cath Lab. Does recommend starting the patient on heparin. Heparin bolus ordered. IV fluids running. [DS]   8141 Spoke with Dr. Angelika Shaw who states that he is coming in to evaluate the patient and plans to take the patient to the Cath Lab for possible pacemaker placement as well as heart cath. Patient updated on care plan. [DS]   0103 Patient troponin 5.48. WBC 19.4. Hemoglobin stable 14.5. [DS]   0104 Dr. Angelika Shaw notified.   Recommends to continue hydration but did not push the fluids too fast.  Plans for cath lab and echocardiogram.  [DS]      ED Course User Index  [DS] Yazan Hernandes MD         Patient was given scripts for the following medications. I counseled patient how to take these medications. New Prescriptions    No medications on file     CRITICAL CARE TIME   Total Critical Care time was 38 minutes, excluding separately reportable procedures. There was a high probability of clinically significant/life threatening deterioration in the patient's condition which required my urgent intervention. CLINICAL IMPRESSION  1. ST elevation myocardial infarction (STEMI), unspecified artery (HCC)    2. Chest pain, unspecified type    3. Non-intractable vomiting with nausea, unspecified vomiting type    4. Abdominal pain, epigastric    5. Shortness of breath        Blood pressure (!) 86/50, pulse 56, temperature 97.6 °F (36.4 °C), temperature source Oral, resp. rate 18, height 6' 1\" (1.854 m), weight 200 lb (90.7 kg), SpO2 100 %. DISPOSITION  Admitted     Disclaimer: All medical record entries made by [place] [address]  tanya.       (Please note that this note was completed with a voice recognition program. Every attempt was made to edit the dictations, but inevitably there remain words that are mis-transcribed.)            Yazan Hernandes MD  12/19/21 0646

## 2021-12-19 NOTE — PROGRESS NOTES
Late entry due to patient care    Pt from CCL s/p IABP, Impella assist device and TVP. Connor Humphries Pt is V paced on monitor. Hematoma at impella insertion site. Dr Allison Elliott at bedside as well at [place] [id number] rep. Femstop in place without inflation simply pulled tight. Bleeding seems controled. IABP site is CDI. Pulses are palpable in bilateral lower extremites. Pt is A&O and following commands. This RN continues to provide emotional support to patient at spouse.

## 2021-12-19 NOTE — PROGRESS NOTES
leukemia/lymphoma  Musculoskeletal: Negative for Connective tissue disease  Neurological:  Negative for Seizure   Behavioral/Psych:Negative for Bipolar disorder, Schizophrenia; Dementia  Endocrine: negative for thyroid, parathyroid disease      Intake/Output Summary (Last 24 hours) at 12/19/2021 1241  Last data filed at 12/19/2021 0900  Gross per 24 hour   Intake 1136.17 ml   Output 475 ml   Net 661.17 ml         Physical Examination:    /71   Pulse 90   Temp 97.7 °F (36.5 °C) (Axillary)   Resp 25   Ht 6' 1\" (1.854 m)   Wt 204 lb 5.9 oz (92.7 kg)   SpO2 99%   BMI 26.96 kg/m²   HEENT:  Face: Atraumatic, Conjunctiva: Pink; non icteric,  Mucous Memb:  Moist, No thyromegaly or Lymphadenopathy  Respiratory:  Resp Assessment: normal, Resp Auscultation: clear   Cardiovascular: Auscultation: nl S1 & S2, Palpation:  Nl PMI;  No heaves or thrills, JVP:  normal  Abdomen: Soft, non-tender, Normal bowel sounds,  No organomegaly  Extremities: No Cyanosis or Clubbing; Edema none  Neurological: Oriented to time, place, and person, Non-anxious  Psychiatric: Normal mood and affect  Skin: Warm and dry,  No rash seen    Current Facility-Administered Medications: sodium chloride flush 0.9 % injection 5-40 mL, 5-40 mL, IntraVENous, 2 times per day  sodium chloride flush 0.9 % injection 5-40 mL, 5-40 mL, IntraVENous, PRN  0.9 % sodium chloride infusion, 25 mL, IntraVENous, PRN  acetaminophen (TYLENOL) tablet 650 mg, 650 mg, Oral, Q4H PRN  atropine injection 0.5 mg, 0.5 mg, IntraVENous, Once PRN  morphine (PF) injection 2 mg, 2 mg, IntraVENous, Once PRN  fentaNYL (SUBLIMAZE) injection 25 mcg, 25 mcg, IntraVENous, Once PRN  midazolam (VERSED) injection 2 mg, 2 mg, IntraVENous, Once PRN  0.9 % sodium chloride bolus, 500 mL, IntraVENous, PRN  ondansetron (ZOFRAN) injection 4 mg, 4 mg, IntraVENous, Q6H PRN  atorvastatin (LIPITOR) tablet 40 mg, 40 mg, Oral, Nightly  0.9 % sodium chloride infusion, , IntraVENous, Continuous  HYDROcodone-acetaminophen (NORCO) 5-325 MG per tablet 1 tablet, 1 tablet, Oral, Q4H PRN **OR** HYDROcodone-acetaminophen (NORCO) 5-325 MG per tablet 2 tablet, 2 tablet, Oral, Q4H PRN  morphine (PF) injection 4 mg, 4 mg, IntraVENous, Q2H PRN  LORazepam (ATIVAN) injection 1 mg, 1 mg, IntraVENous, Q4H PRN  [START ON 12/20/2021] aspirin chewable tablet 81 mg, 81 mg, Oral, Daily  eptifibatide (INTEGRILIN) 0.75 mg/mL infusion, 2 mcg/kg/min, IntraVENous, Continuous  norepinephrine (LEVOPHED) 16 mg in dextrose 5% 250 mL infusion, 2-30 mcg/min, IntraVENous, Continuous  heparin (porcine) 25,000 Units in dextrose 5 % 500 mL infusion (FOR IMPELLA PURGE), , IntraCATHeter, Continuous  heparin 25,000 units in dextrose 5% 250 mL (premix) infusion, 0-3,000 Units/hr, IntraVENous, Continuous  potassium chloride (KLOR-CON) extended release tablet 40 mEq, 40 mEq, Oral, BID  ticagrelor (BRILINTA) tablet 90 mg, 90 mg, Oral, BID         Labs:   Recent Labs     12/19/21  0011 12/19/21  0403   WBC 19.4* 16.9*   HGB 14.5 12.8*   HCT 43.1 37.9*    214     Recent Labs     12/19/21  0011 12/19/21  0910   * 135*   K 4.2 3.5   CO2 24 26   BUN 29* 28*   CREATININE 1.3 1.2   LABGLOM 57* >60     No results for input(s): BNP in the last 72 hours. No results for input(s): PROTIME, INR in the last 72 hours.   Recent Labs     12/19/21  0011 12/19/21  0403 12/19/21  0910   TROPONINI 5.48* 4.36* 3.17*       Telemetry Monitor:   Sinus rhythm with V pacing      EKG:   Sinus rhythm with heart block inferior and anterolateral infarct    ECHO:  Bedside portable echo done by me in the Cath Lab: EF about 30 to 35% Wall motion preserved in this basal half of the septum anterior wall lateral wall and inferior wall    Corornary angiogram  & Intervention:  Ostial left main without clot and TERESSA grade II flow  RCA normal dominant  Of EDP 15  Placement of temporary pacing wire in the RV  Placement of intra-aortic balloon pump  Placement of Impella    ASSESSMENT AND PLAN:      Ostial left main thrombus with TERESSA grade I-II flow. RCA was normal LVEDP was 15  EF prior to the procedure done at the bedside was about 30 to 35%  Completed large anterior lateral apical infarct  Cardiogenic shock  Sinus rhythm with complete heart block    Patient treated with emergent temporary pacemaker, intra-aortic balloon pump, Impella, balloon angioplasty and thrombectomy of the ostial left main clot. This was followed by treatment with Integrilin which he still is getting    Overnight he has been on intra-aortic balloon pump as well as Impella. He is also on Levophed blood pressure is in the 90s with maintenance of oxygenation. He is pacemaker dependent. He has had a decent urine output. Feels extremely thirsty despite getting fluids at 150 cc an hour. Plan is to remove the intra-aortic balloon pump and leave him on the Impella device. The patient appears to be still dry and we will give him another 1,000 cc bolus and continue fluids at 200 cc an hour. An echocardiogram is pending    He is maintaining his kidney functions  Lactic acid level and BNP is pending    I will add Brilinta to aspirin ; continue Levophed Integrilin and heparin      Critical care time spent is 1 hour      Ayush FLORES  12/19/2021

## 2021-12-19 NOTE — CONSULTS
[facility]  Admission [facility]    CC: Complete heart block               No primary care provider on file.:           HPI:   This is a 55 y.o. male who came to the emergency room for evaluation of chest pain. I will assess see the EKG on the patient which showed the patient to be in sinus rhythm with complete heart block. Completed inferior and anterior lateral infarct with good persistent ST elevations. The patient was having sharp pain on the left side of his chest which would get worse with deep inspirations. I thought that the patient had a heart attack which was a fairly large 1 almost 24 hours ago. The patient had severe chest pain the night before lasted all night with nausea vomiting and diaphoresis. He had it all day. Patient has developed complete heart block . On my arrival here the patient was already in the Cath Lab he was laying flat in his bed alert awake and oriented. He said that he was short of breath and was having sharp pain when he took a deep breath. The skin was warm and the color look good. He noted to me that he had severe pressure heaviness in his chest the night before. Past Medical History:   Diagnosis Date    Hypertension         No past surgical history on file. No family history on file.   No history of sudden cardiac death QT prolongation premature coronary artery disease     Social History     Tobacco Use    Smoking status: Not on file    Smokeless tobacco: Not on file   Substance Use Topics    Alcohol use: Not on file    Drug use: Not on file   Is a  lives with his wife     No Known Allergies          Review of Systems -   Constitutional: Negative for weight gain/loss; malaise, fever  Respiratory: Negative for Asthma;  cough and hemoptysis  Cardiovascular: Negative for palpitations,dizziness   Gastrointestinal: Negative for abd.pain; constipation/diarrhea;    Genitourinary: Negative for stones; hematuria; frequency hesitancy  Integumentt: Negative for rash or pruritis  Hematologic/lymphatic: Negative for blood dyscrasia; leukemia/lymphoma  Musculoskeletal: Negative for Connective tissue disease  Neurological:  Negative for Seizure   Behavioral/Psych:Negative for Bipolar disorder, Schizophrenia; Dementia  Endocrine: negative for thyroid, parathyroid disease    No intake or output data in the 24 hours ending 12/19/21 0235     Physical Examination:    BP (!) 86/50   Pulse 56   Temp 97.6 °F (36.4 °C) (Oral)   Resp 18   Ht 6' 1\" (1.854 m)   Wt 200 lb (90.7 kg)   SpO2 100%   BMI 26.39 kg/m²    Vitals:    12/19/21 0030 12/19/21 0040 12/19/21 0055 12/19/21 0102   BP: (!) 80/47 (!) 79/49 (!) 77/50 (!) 86/50   Pulse: 54 61 54 56   Resp: 15 24 14 18   Temp:       TempSrc:       SpO2: 95% 95% 97% 100%   Weight:       Height:         Wt Readings from Last 3 Encounters:   12/19/21 200 lb (90.7 kg)      BP Readings from Last 3 Encounters:   12/19/21 (!) 86/50         HEENT:  Face: Atraumatic, Conjunctiva: Pink; non icteric,  Mucous Memb:  Moist, No thyromegaly or Lymphadenopathy  Respiratory:  Resp Assessment: normal, Resp Auscultation: clear   Cardiovascular: Auscultation: nl S1 & S2, Palpation:  Nl PMI; No heaves or thrills, JVP:  normal  Abdomen: Soft, non-tender, Normal bowel sounds,  No organomegaly  Extremities: No Cyanosis or Clubbing  Neurological: Oriented to time, place, and person, Non-anxious  Psychiatric: Normal mood and affect  Skin: Warm and dry,  No rash seen    No current facility-administered medications for this encounter. Labs:   Recent Labs     12/19/21  0011   WBC 19.4*   HGB 14.5   HCT 43.1        Recent Labs     12/19/21  0011   *   K 4.2   CO2 24   BUN 29*   CREATININE 1.3   LABRCNT(BNP:2)@  No results for input(s): PROTIME, INR in the last 72 hours. No results for input(s): APTT in the last 72 hours.   Recent Labs     12/19/21  0011   TROPONINI 5.48*     No results found for: HDL, LDLDIRECT, LDLCALC, TRIG  No results for input(s): AST, ALT, LABALBU in the last 72 hours. EKG:   Sinus rhythm with complete heart block  Inferior infarct anterior lateral infarct with persistent ST elevations    ASSESSMENT AND PLAN:        Probably completed large anterior lateral and inferior infarct  Complicated by complete heart block with a heart rate of 56  Blood pressure is in the 70s to 80s suggestive of cardiogenic shock    Plan is to take him to the Cath Lab for temporary pacemaker, coronary geography likely Impella/balloon pump. Seizure was explained to patient's who understands and wishes to proceed         Sofia FLORES  12/19/2021

## 2021-12-19 NOTE — PROCEDURES
[address]   Procedure Note    CLINICAL HISTORY:       Elizabeth Morel is a 55 y.o. male who presented 24 hours after severe chest pain nausea vomiting  He was complaining of pleuritic chest pain on the left side and was short of breath. EKG showed complete heart block with a completed inferior and apical anterolateral infarct    INDICATION:  Complete heart block, cardiogenic shock. Completed anterior lateral inferior infarct    PROCEDURES PERFORMED:   Placement of a percutaneous RV temporary pacer  Left heart catheterization coronary angiogram and LV pressure measurements  Intra-aortic balloon pump placement  Impella placement    PROCEDURE TECHNIQUE:  The patient was approached from the R femoral axix site using a 5-6  Sammarinese slender sheath. Left coronary angiography was done using a JL 3.5 diagnostic catheter. Right coronary angiography was done using a JR4 catheter. Left ventriculography was done using a pigtail catheter. For the intra-aortic balloon pump the left femoral arterial access was obtained. Right femoral vein for the temporary pacemaker     COMPLICATIONS:  None. EBL: 50 cc    Moderate Sedation:  Start time: 12.30  Stop time: 2.30  2 mg versed   100 mcg fentanyl   An independent trained observer pushed medications at my direction. We monitored the patient's level of consciousness and vital signs/physiologic status throughout the procedure duration (see start and start times above). HEMODYNAMICS:      Aortic pressure was 70    LVEDP 15. There was no gradient between the left ventricle and aorta.       CORONARY ARTERIOGRAM:       There was no atherosclerotic disease in the coronary circulation except a clot in the ostial left main    L Main ostial clot with TERESSA grade I flow    LAD mid distal LAD had no flow    LCX extreme distal circumflex had no flow    RCA widely patent    LV GRAM: Not performed      CONCLUSION:    Clot in the ostial left main with TERESSA grade I 2 flow into

## 2021-12-19 NOTE — ED NOTES
Pt reports body aches for 4-5 days, states chest pain after emesis last PM. States unable to eat anything for a few days, denies any fever. States pain when taking deep breath.       Sharmila Aquino RN  12/19/21 52 Marcy Crowell RN  12/19/21 [id number]

## 2021-12-19 NOTE — PROGRESS NOTES
Clinical Pharmacy Note  Heparin Dosing - Impella Device    Patient receiving heparin purge solution via Impella device. Consult received from Dr. Malia Baker to titrate systemic heparin to maintain -180. Shift ACT Results and Heparin Adjustments:      Date   Time POC ACT  Result Heparin  Bolus   (units) Systemic Heparin Infusion Rate (100 units/mL)   12/19/21 0400 188      0500 164  300 units/hr    0600 150 1000 units 400 units/hr    0700 159  450 units/hr       0426 - Purge solution started    Pharmacy will continue to monitor and adjust based on ACT results.     Tia Cowden, [address]  12/19/2021 7:10 AM

## 2021-12-20 ENCOUNTER — APPOINTMENT (OUTPATIENT)
Dept: GENERAL RADIOLOGY | Age: 56
DRG: 215 | End: 2021-12-20

## 2021-12-20 LAB
A/G RATIO: 1 (ref 1.1–2.2)
ALBUMIN SERPL-MCNC: 2.5 G/DL (ref 3.4–5)
ALP BLD-CCNC: 38 U/L (ref 40–129)
ALT SERPL-CCNC: 59 U/L (ref 10–40)
ANION GAP SERPL CALCULATED.3IONS-SCNC: 7 MMOL/L (ref 3–16)
AST SERPL-CCNC: 141 U/L (ref 15–37)
BASE EXCESS VENOUS: 4 MMOL/L
BASOPHILS ABSOLUTE: 0 K/UL (ref 0–0.2)
BASOPHILS RELATIVE PERCENT: 0.2 %
BILIRUB SERPL-MCNC: 0.7 MG/DL (ref 0–1)
BUN BLDV-MCNC: 22 MG/DL (ref 7–20)
CALCIUM SERPL-MCNC: 7.5 MG/DL (ref 8.3–10.6)
CARBOXYHEMOGLOBIN: 1.6 %
CHLORIDE BLD-SCNC: 102 MMOL/L (ref 99–110)
CO2: 25 MMOL/L (ref 21–32)
CREAT SERPL-MCNC: 0.7 MG/DL (ref 0.9–1.3)
EKG ATRIAL RATE: 104 BPM
EKG ATRIAL RATE: 90 BPM
EKG DIAGNOSIS: NORMAL
EKG DIAGNOSIS: NORMAL
EKG P AXIS: -51 DEGREES
EKG P AXIS: 55 DEGREES
EKG P-R INTERVAL: 170 MS
EKG P-R INTERVAL: 264 MS
EKG Q-T INTERVAL: 348 MS
EKG Q-T INTERVAL: 440 MS
EKG QRS DURATION: 144 MS
EKG QRS DURATION: 172 MS
EKG QTC CALCULATION (BAZETT): 457 MS
EKG QTC CALCULATION (BAZETT): 538 MS
EKG R AXIS: -47 DEGREES
EKG R AXIS: 133 DEGREES
EKG T AXIS: -3 DEGREES
EKG T AXIS: 101 DEGREES
EKG VENTRICULAR RATE: 104 BPM
EKG VENTRICULAR RATE: 90 BPM
EOSINOPHILS ABSOLUTE: 0.1 K/UL (ref 0–0.6)
EOSINOPHILS RELATIVE PERCENT: 0.5 %
GFR AFRICAN AMERICAN: >60
GFR NON-AFRICAN AMERICAN: >60
GLUCOSE BLD-MCNC: 100 MG/DL (ref 70–99)
HCO3 VENOUS: 29 MMOL/L (ref 23–29)
HCT VFR BLD CALC: 28.5 % (ref 40.5–52.5)
HEMATOLOGY PATH CONSULT: NORMAL
HEMOGLOBIN: 9.9 G/DL (ref 13.5–17.5)
LYMPHOCYTES ABSOLUTE: 2.1 K/UL (ref 1–5.1)
LYMPHOCYTES RELATIVE PERCENT: 19.9 %
MAGNESIUM: 1.6 MG/DL (ref 1.8–2.4)
MCH RBC QN AUTO: 32.3 PG (ref 26–34)
MCHC RBC AUTO-ENTMCNC: 34.7 G/DL (ref 31–36)
MCV RBC AUTO: 93 FL (ref 80–100)
METHEMOGLOBIN VENOUS: 0.7 %
MONOCYTES ABSOLUTE: 1.1 K/UL (ref 0–1.3)
MONOCYTES RELATIVE PERCENT: 10.8 %
NEUTROPHILS ABSOLUTE: 7.2 K/UL (ref 1.7–7.7)
NEUTROPHILS RELATIVE PERCENT: 68.6 %
O2 SAT, VEN: 42 %
O2 THERAPY: NORMAL
PCO2, VEN: 44.6 MMHG (ref 40–50)
PDW BLD-RTO: 13.9 % (ref 12.4–15.4)
PH VENOUS: 7.42 (ref 7.35–7.45)
PHOSPHORUS: 1 MG/DL (ref 2.5–4.9)
PLATELET # BLD: 152 K/UL (ref 135–450)
PMV BLD AUTO: 7.9 FL (ref 5–10.5)
PO2, VEN: <30 MMHG
POC ACT LR: 146 SEC
POC ACT LR: 157 SEC
POC ACT LR: 157 SEC
POC ACT LR: 158 SEC
POC ACT LR: 159 SEC
POC ACT LR: 160 SEC
POC ACT LR: 161 SEC
POC ACT LR: 166 SEC
POC ACT LR: 167 SEC
POC ACT LR: 167 SEC
POC ACT LR: 169 SEC
POC ACT LR: 170 SEC
POC ACT LR: 170 SEC
POTASSIUM SERPL-SCNC: 3.7 MMOL/L (ref 3.5–5.1)
RBC # BLD: 3.07 M/UL (ref 4.2–5.9)
SODIUM BLD-SCNC: 134 MMOL/L (ref 136–145)
TCO2 CALC VENOUS: 30 MMOL/L
TOTAL PROTEIN: 4.9 G/DL (ref 6.4–8.2)
WBC # BLD: 10.5 K/UL (ref 4–11)

## 2021-12-20 PROCEDURE — 2500000003 HC RX 250 WO HCPCS: Performed by: NURSE PRACTITIONER

## 2021-12-20 PROCEDURE — 2580000003 HC RX 258: Performed by: INTERNAL MEDICINE

## 2021-12-20 PROCEDURE — 93308 TTE F-UP OR LMTD: CPT

## 2021-12-20 PROCEDURE — 2100000000 HC CCU R&B

## 2021-12-20 PROCEDURE — 6360000002 HC RX W HCPCS: Performed by: INTERNAL MEDICINE

## 2021-12-20 PROCEDURE — 93005 ELECTROCARDIOGRAM TRACING: CPT | Performed by: INTERNAL MEDICINE

## 2021-12-20 PROCEDURE — 99291 CRITICAL CARE FIRST HOUR: CPT | Performed by: INTERNAL MEDICINE

## 2021-12-20 PROCEDURE — 94761 N-INVAS EAR/PLS OXIMETRY MLT: CPT

## 2021-12-20 PROCEDURE — 93010 ELECTROCARDIOGRAM REPORT: CPT | Performed by: INTERNAL MEDICINE

## 2021-12-20 PROCEDURE — 6360000002 HC RX W HCPCS: Performed by: NURSE PRACTITIONER

## 2021-12-20 PROCEDURE — 85025 COMPLETE CBC W/AUTO DIFF WBC: CPT

## 2021-12-20 PROCEDURE — 80053 COMPREHEN METABOLIC PANEL: CPT

## 2021-12-20 PROCEDURE — 82803 BLOOD GASES ANY COMBINATION: CPT

## 2021-12-20 PROCEDURE — 2580000003 HC RX 258: Performed by: NURSE PRACTITIONER

## 2021-12-20 PROCEDURE — 99233 SBSQ HOSP IP/OBS HIGH 50: CPT | Performed by: INTERNAL MEDICINE

## 2021-12-20 PROCEDURE — 6370000000 HC RX 637 (ALT 250 FOR IP): Performed by: INTERNAL MEDICINE

## 2021-12-20 PROCEDURE — 93005 ELECTROCARDIOGRAM TRACING: CPT | Performed by: NURSE PRACTITIONER

## 2021-12-20 PROCEDURE — 85347 COAGULATION TIME ACTIVATED: CPT

## 2021-12-20 PROCEDURE — 83735 ASSAY OF MAGNESIUM: CPT

## 2021-12-20 PROCEDURE — 84100 ASSAY OF PHOSPHORUS: CPT

## 2021-12-20 PROCEDURE — 71045 X-RAY EXAM CHEST 1 VIEW: CPT

## 2021-12-20 RX ORDER — MAGNESIUM SULFATE IN WATER 40 MG/ML
2000 INJECTION, SOLUTION INTRAVENOUS ONCE
Status: COMPLETED | OUTPATIENT
Start: 2021-12-20 | End: 2021-12-20

## 2021-12-20 RX ORDER — LANOLIN ALCOHOL/MO/W.PET/CERES
6 CREAM (GRAM) TOPICAL NIGHTLY PRN
Status: DISCONTINUED | OUTPATIENT
Start: 2021-12-20 | End: 2021-12-23 | Stop reason: HOSPADM

## 2021-12-20 RX ORDER — MAGNESIUM SULFATE 1 G/100ML
1000 INJECTION INTRAVENOUS PRN
Status: DISCONTINUED | OUTPATIENT
Start: 2021-12-20 | End: 2021-12-23 | Stop reason: HOSPADM

## 2021-12-20 RX ORDER — EPTIFIBATIDE 0.75 MG/ML
2 INJECTION, SOLUTION INTRAVENOUS CONTINUOUS
Status: DISCONTINUED | OUTPATIENT
Start: 2021-12-20 | End: 2021-12-20

## 2021-12-20 RX ADMIN — Medication 6 MG: at 20:01

## 2021-12-20 RX ADMIN — SODIUM CHLORIDE: 9 INJECTION, SOLUTION INTRAVENOUS at 18:32

## 2021-12-20 RX ADMIN — HYDROCODONE BITARTRATE AND ACETAMINOPHEN 2 TABLET: 5; 325 TABLET ORAL at 14:21

## 2021-12-20 RX ADMIN — TICAGRELOR 90 MG: 90 TABLET ORAL at 08:32

## 2021-12-20 RX ADMIN — LORAZEPAM 1 MG: 2 INJECTION INTRAMUSCULAR; INTRAVENOUS at 10:08

## 2021-12-20 RX ADMIN — HYDROCODONE BITARTRATE AND ACETAMINOPHEN 2 TABLET: 5; 325 TABLET ORAL at 10:08

## 2021-12-20 RX ADMIN — MORPHINE SULFATE 4 MG: 4 INJECTION, SOLUTION INTRAMUSCULAR; INTRAVENOUS at 12:08

## 2021-12-20 RX ADMIN — LORAZEPAM 1 MG: 2 INJECTION INTRAMUSCULAR; INTRAVENOUS at 20:01

## 2021-12-20 RX ADMIN — HEPARIN SODIUM: 5000 INJECTION INTRAVENOUS; SUBCUTANEOUS at 19:42

## 2021-12-20 RX ADMIN — AMIODARONE HYDROCHLORIDE 1 MG/MIN: 50 INJECTION, SOLUTION INTRAVENOUS at 21:20

## 2021-12-20 RX ADMIN — EPTIFIBATIDE 2 MCG/KG/MIN: 75 INJECTION INTRAVENOUS at 14:17

## 2021-12-20 RX ADMIN — AMIODARONE HYDROCHLORIDE 150 MG: 50 INJECTION, SOLUTION INTRAVENOUS at 21:08

## 2021-12-20 RX ADMIN — SODIUM CHLORIDE: 9 INJECTION, SOLUTION INTRAVENOUS at 12:54

## 2021-12-20 RX ADMIN — HEPARIN SODIUM 900 UNITS/HR: 5000 INJECTION INTRAVENOUS; SUBCUTANEOUS at 14:45

## 2021-12-20 RX ADMIN — MORPHINE SULFATE 4 MG: 4 INJECTION, SOLUTION INTRAMUSCULAR; INTRAVENOUS at 02:26

## 2021-12-20 RX ADMIN — POTASSIUM CHLORIDE 40 MEQ: 750 TABLET, FILM COATED, EXTENDED RELEASE ORAL at 08:32

## 2021-12-20 RX ADMIN — TICAGRELOR 90 MG: 90 TABLET ORAL at 20:01

## 2021-12-20 RX ADMIN — Medication 10 ML: at 20:30

## 2021-12-20 RX ADMIN — MAGNESIUM SULFATE HEPTAHYDRATE 2000 MG: 40 INJECTION, SOLUTION INTRAVENOUS at 21:04

## 2021-12-20 RX ADMIN — HYDROCODONE BITARTRATE AND ACETAMINOPHEN 2 TABLET: 5; 325 TABLET ORAL at 20:00

## 2021-12-20 RX ADMIN — CALCIUM GLUCONATE 1000 MG: 20 INJECTION, SOLUTION INTRAVENOUS at 20:58

## 2021-12-20 RX ADMIN — ATORVASTATIN CALCIUM 40 MG: 40 TABLET, FILM COATED ORAL at 20:01

## 2021-12-20 RX ADMIN — SODIUM CHLORIDE 25 ML: 9 INJECTION, SOLUTION INTRAVENOUS at 20:58

## 2021-12-20 RX ADMIN — MORPHINE SULFATE 4 MG: 4 INJECTION, SOLUTION INTRAMUSCULAR; INTRAVENOUS at 08:32

## 2021-12-20 RX ADMIN — ASPIRIN 81 MG: 81 TABLET, CHEWABLE ORAL at 08:32

## 2021-12-20 RX ADMIN — SODIUM CHLORIDE: 9 INJECTION, SOLUTION INTRAVENOUS at 23:45

## 2021-12-20 RX ADMIN — EPTIFIBATIDE 2 MCG/KG/MIN: 75 INJECTION INTRAVENOUS at 06:32

## 2021-12-20 RX ADMIN — SODIUM CHLORIDE 25 ML: 9 INJECTION, SOLUTION INTRAVENOUS at 21:01

## 2021-12-20 RX ADMIN — POTASSIUM CHLORIDE 40 MEQ: 750 TABLET, FILM COATED, EXTENDED RELEASE ORAL at 20:01

## 2021-12-20 ASSESSMENT — PAIN DESCRIPTION - PROGRESSION
CLINICAL_PROGRESSION: GRADUALLY WORSENING
CLINICAL_PROGRESSION: GRADUALLY WORSENING

## 2021-12-20 ASSESSMENT — PAIN DESCRIPTION - DESCRIPTORS
DESCRIPTORS: ACHING;SORE
DESCRIPTORS: ACHING;SORE

## 2021-12-20 ASSESSMENT — PAIN SCALES - GENERAL
PAINLEVEL_OUTOF10: 6
PAINLEVEL_OUTOF10: 8
PAINLEVEL_OUTOF10: 8
PAINLEVEL_OUTOF10: 6
PAINLEVEL_OUTOF10: 7
PAINLEVEL_OUTOF10: 8
PAINLEVEL_OUTOF10: 8
PAINLEVEL_OUTOF10: 7

## 2021-12-20 ASSESSMENT — PAIN DESCRIPTION - PAIN TYPE
TYPE: CHRONIC PAIN
TYPE: CHRONIC PAIN

## 2021-12-20 ASSESSMENT — PAIN - FUNCTIONAL ASSESSMENT: PAIN_FUNCTIONAL_ASSESSMENT: PREVENTS OR INTERFERES SOME ACTIVE ACTIVITIES AND ADLS

## 2021-12-20 ASSESSMENT — PAIN DESCRIPTION - LOCATION
LOCATION: BACK
LOCATION: BACK

## 2021-12-20 ASSESSMENT — PAIN DESCRIPTION - ORIENTATION
ORIENTATION: LOWER
ORIENTATION: LOWER

## 2021-12-20 ASSESSMENT — PAIN DESCRIPTION - ONSET
ONSET: ON-GOING
ONSET: ON-GOING

## 2021-12-20 ASSESSMENT — PAIN DESCRIPTION - FREQUENCY
FREQUENCY: CONTINUOUS
FREQUENCY: CONTINUOUS

## 2021-12-20 NOTE — PROGRESS NOTES
Dr. Leilani Sandifer at bedside during ECHO. Impella device retracted 1 cm, from 94 to 93 cm. Performance level increased from P-5 to P-6 per Dr. Leilani Sandifer.     Electronically signed by Anne Orellana RN on 12/20/2021 at 3:25 PM

## 2021-12-20 NOTE — PROGRESS NOTES
Right groin site soft, no oozing or bleeding, impella and temporary pacemaker intact, dressing in place. Patient has no complaints.     Franko Long Cardiac Cath Lab RN, BSN

## 2021-12-20 NOTE — PROGRESS NOTES
Pt up over 3kg in weight from yesterday. Lung sounds now have faint crackles in bases. No suction alarms for this RN. 200 hr NS placed on hold for primary team to evaluate during rounds.

## 2021-12-20 NOTE — PROGRESS NOTES
Clinical Pharmacy Note  Heparin Dosing - Impella Device    Patient receiving heparin purge solution via Impella device. Consult received from Dr. Janine Feliciano to titrate systemic heparin to maintain -180. Shift ACT Results and Heparin Adjustments:      Date   Time POC ACT  Result Heparin  Bolus   (units) Systemic Heparin Infusion Rate (100 units/mL)   12/20 0800 163  900 units/hr   12/20 0900 168  900 units/hr   12/20 1000 173  900 units/hr   12/20 1200 165  900 units/hr   12/20 1400 167  900 units/hr       Pharmacy will continue to monitor and adjust based on ACT results.   Bibi Rosas, [facility], [address] 12/20/2021 2:22 PM

## 2021-12-20 NOTE — PROGRESS NOTES
[facility], [place]   Daily Progress Note      Admit Date:  12/18/2021      Subjective:   Mr. Loreto Torres is a 52yo male who presented to [facility] on 12/19 with chest pain. He was found to have acute inferior and anterior ST changes with a troponin of 5. He was taken to the cath lab by Dr. Yi Jaquez on 12/19/20. He had thrombus in his LM and this underwent aspiration thrombectomy followe dby PTCA with a 4mm balloon. He had an Impella placed at that time. Interval History:  Halie Cazares reports feeling well. He denies any chest pain or shortness of breath. Sinus rhythm on telemetry. He has an Impella in the right groin at P6 delivering 2.4L/min of CO. Good urine output.        Objective:     /66   Pulse 98   Temp 98.3 °F (36.8 °C) (Oral)   Resp 12   Ht 6' 1\" (1.854 m)   Wt 211 lb 6.7 oz (95.9 kg)   SpO2 98%   BMI 27.89 kg/m²      Intake/Output Summary (Last 24 hours) at 12/20/2021 1446  Last data filed at 12/20/2021 1400  Gross per 24 hour   Intake 5405.16 ml   Output 3435 ml   Net 1970.16 ml       Physical Exam:  General:  Awake, alert, NAD  Skin:  Warm and dry  Neck:  JVD<8, no carotid bruits  Chest:  Clear to auscultation, no wheezes/rhonchi/rales  Cardiovascular:  RRR, normal S1/S2, no M/R/G  Abdomen:  Soft, nontender, +bowel sounds  Extremities:  No edema  Pulses: 2+ bilat carotid    2+ bilat radial    2+ bilat femoral        Medications:    sodium chloride flush  5-40 mL IntraVENous 2 times per day    atorvastatin  40 mg Oral Nightly    aspirin  81 mg Oral Daily    potassium chloride  40 mEq Oral BID    ticagrelor  90 mg Oral BID      eptifibatide 2 mcg/kg/min (12/20/21 1417)    sodium chloride      sodium chloride 200 mL/hr at 12/20/21 1254    norepinephrine Stopped (12/20/21 0130)    heparin 25,000 units in dextrose 5 %      heparin (PORCINE) Infusion 900 Units/hr (12/20/21 1445)       Lab Data:  CBC:   Recent Labs     12/19/21  0011 12/19/21  0403   WBC 19.4* 16.9*   HGB 14.5 12.8*   PLT 254 214     BMP:    Recent Labs     12/19/21  0011 12/19/21  0910 12/20/21  0405   * 135* 134*   K 4.2 3.5 3.7   CO2 24 26 25   BUN 29* 28* 22*   CREATININE 1.3 1.2 0.7*     LIVR:   Recent Labs     12/19/21  0910 12/20/21  0405   * 141*   ALT 75* 59*     PT/INR:   Recent Labs     12/19/21  0910 12/20/21  0405   PROT 5.8* 4.9*     Recent Labs     12/19/21  0011 12/19/21  0403 12/19/21  0910   TROPONINI 5.48* 4.36* 3.17*         Left Heart Catheterization 12/19/21:    HEMODYNAMICS:       Aortic pressure was 70    LVEDP 15. There was no gradient between the left ventricle and aorta.       CORONARY ARTERIOGRAM:        There was no atherosclerotic disease in the coronary circulation except a clot in the ostial left main     L Main ostial clot with TERESSA grade I flow     LAD mid distal LAD had no flow     LCX extreme distal circumflex had no flow     RCA widely patent     LV GRAM: Not performed        CONCLUSION:     Clot in the ostial left main with TERESSA grade I 2 flow into the LAD and circumflex  Dominant RCA was widely patent  LV gram was not performed     INTERVENTION     Procedures:   Temporary pacemaker placement using right femoral vein approach  Left heart catheterization coronary angiogram LV angiogram  Percutaneous coronary angioplasty and thrombectomy of the ostial left main stent  Insertion of intra-aortic balloon pump using the left femoral artery  Insertion of Impella device using right femoral artery    Assessment / Plan:     1. Acute Anterior Myocardial Infarction  Continue aspirin and Brilinta. AMI seems secondary to embolization. Will consider triple therapy. Getting heparin in purge solution now with Impella in place. Hold beta blockers/ ACEI/ARB and aldactone therapy with cardiogenic shock for right now. 2. Cardiogenic Shock  Impella in place at P6 delivering 2.4L/min of CO. Will likely leave this in place tonight and consider removing tomorrow morning.      3 Hyperlipidemia with goal LDL<70mg/dL  Continue statin therapy with atorvastatin 40mg daily. Total critical care time 35 minutes.      Signed:  Franky Cartagena MD

## 2021-12-20 NOTE — PLAN OF CARE
Problem: Pain:  Description: Pain management should include both nonpharmacologic and pharmacologic interventions.   Goal: Pain level will decrease  Description: Pain level will decrease  12/20/2021 1717 by Jazmyne Contreras RN  Outcome: Ongoing  12/20/2021 0727 by Maria Alejandra Godinez RN  Outcome: Ongoing  Goal: Control of acute pain  Description: Control of acute pain  12/20/2021 1717 by Jazmyne Contreras RN  Outcome: Ongoing  12/20/2021 0727 by Maria Alejandra Godinez RN  Outcome: Ongoing  Goal: Control of chronic pain  Description: Control of chronic pain  12/20/2021 1717 by Jazmyne Contreras RN  Outcome: Ongoing  12/20/2021 0727 by Maria Alejandra Godinez RN  Outcome: Ongoing     Problem: Skin Integrity:  Goal: Will show no infection signs and symptoms  Description: Will show no infection signs and symptoms  12/20/2021 1717 by Jazmyne Contreras RN  Outcome: Ongoing    Goal: Absence of new skin breakdown  Description: Absence of new skin breakdown  12/20/2021 1717 by aJzmyne Contreras RN       Problem: Anxiety/Stress:  Goal: Level of anxiety will decrease  Description: Level of anxiety will decrease  Outcome: Ongoing     Problem: Cardiac Output - Decreased:  Goal: Hemodynamic stability will improve  Description: Hemodynamic stability will improve  Outcome: Ongoing

## 2021-12-20 NOTE — PROGRESS NOTES
[address]  Inpatient Progress Note    CC:        Cardiogenic shock/complete heart block/anterior MI          HPI:   This is a 55 y.o. male no previous cardiac history who presented to the hospital with pleuritic chest pain shortness of breath. His blood pressure was in the 60s to 70s and his heart rate was in the 50s. He was laying flat speaking mentating and his oxygen level was in the lower r 90s;  his EKG showed sinus rhythm with complete heart block large anterior lateral and inferior infarct. A bedside echo that I did showed EF of 35% with wall motion preserved in the basal septum anterior basal anterior wall and basal lateral walls. He was taken emergently to the Cath Lab where a temporary pacemaker was placed, he underwent a left heart catheterization which showed ostial left main clot with TERESSA grade I-II flow. His LVEDP was 15. The clot was treated with thrombectomy and plain balloon angioplasty. The right coronary artery was completely normal as was all the distal vessels. He was then treated with intra-aortic balloon pump followed by an [place]. Despite this severe acute presentation the patient was able to lay flat was maintaining his oxygen and talking. His color looks good and his skin was warm. It turns out that the patient really started having chest pain associated with severe nausea vomiting. He was sick all Friday 4.  8 hours later uring the day, night Saturday during the day and then he finally came to the ER    Interval history:  Patient laying comfortably in bed in no distress denies any shortness of breath or chest pain. Claims he feels great. He is on Integrilin and heparin.   Levophed was discontinued  Fluid balance +5180      Review of Systems -   Constitutional: Negative for weight gain/loss; malaise, fever  Respiratory: Negative for Asthma;  cough and hemoptysis  Cardiovascular: Negative for palpitations,dizziness   Gastrointestinal: Negative for abd.pain; constipation/diarrhea;    Genitourinary: Negative for stones; hematuria; frequency hesitancy  Integumentt: Negative for rash or pruritis  Hematologic/lymphatic: Negative for blood dyscrasia; leukemia/lymphoma  Musculoskeletal: Negative for Connective tissue disease  Neurological:  Negative for Seizure   Behavioral/Psych:Negative for Bipolar disorder, Schizophrenia; Dementia  Endocrine: negative for thyroid, parathyroid disease      Intake/Output Summary (Last 24 hours) at 12/20/2021 0751  Last data filed at 12/20/2021 0700  Gross per 24 hour   Intake 7234.03 ml   Output 2895 ml   Net 4339.03 ml         Physical Examination:    BP 96/71   Pulse 99   Temp 98.2 °F (36.8 °C) (Axillary)   Resp 12   Ht 6' 1\" (1.854 m)   Wt 211 lb 6.7 oz (95.9 kg)   SpO2 96%   BMI 27.89 kg/m²   HEENT:  Face: Atraumatic, Conjunctiva: Pink; non icteric,  Mucous Memb:  Moist, No thyromegaly or Lymphadenopathy  Respiratory:  Resp Assessment: normal, Resp Auscultation: clear   Cardiovascular: Auscultation: nl S1 & S2, Palpation:  Nl PMI;  No heaves or thrills, JVP:  normal  Abdomen: Soft, non-tender, Normal bowel sounds,  No organomegaly  Extremities: No Cyanosis or Clubbing; Edema none  Neurological: Oriented to time, place, and person, Non-anxious  Psychiatric: Normal mood and affect  Skin: Warm and dry,  No rash seen    Current Facility-Administered Medications: eptifibatide (INTEGRILIN) 0.75 mg/mL infusion, 2 mcg/kg/min, IntraVENous, Continuous  sodium chloride flush 0.9 % injection 5-40 mL, 5-40 mL, IntraVENous, 2 times per day  sodium chloride flush 0.9 % injection 5-40 mL, 5-40 mL, IntraVENous, PRN  0.9 % sodium chloride infusion, 25 mL, IntraVENous, PRN  acetaminophen (TYLENOL) tablet 650 mg, 650 mg, Oral, Q4H PRN  0.9 % sodium chloride bolus, 500 mL, IntraVENous, PRN  ondansetron (ZOFRAN) injection 4 mg, 4 mg, IntraVENous, Q6H PRN  atorvastatin (LIPITOR) tablet 40 mg, 40 mg, Oral, Nightly  0.9 % sodium chloride infusion, , IntraVENous, Continuous  HYDROcodone-acetaminophen (NORCO) 5-325 MG per tablet 1 tablet, 1 tablet, Oral, Q4H PRN **OR** HYDROcodone-acetaminophen (NORCO) 5-325 MG per tablet 2 tablet, 2 tablet, Oral, Q4H PRN  morphine (PF) injection 4 mg, 4 mg, IntraVENous, Q2H PRN  LORazepam (ATIVAN) injection 1 mg, 1 mg, IntraVENous, Q4H PRN  aspirin chewable tablet 81 mg, 81 mg, Oral, Daily  norepinephrine (LEVOPHED) 16 mg in dextrose 5% 250 mL infusion, 2-30 mcg/min, IntraVENous, Continuous  heparin (porcine) 25,000 Units in dextrose 5 % 500 mL infusion (FOR IMPELLA PURGE), , IntraCATHeter, Continuous  heparin 25,000 units in dextrose 5% 250 mL (premix) infusion, 0-3,000 Units/hr, IntraVENous, Continuous  potassium chloride (KLOR-CON) extended release tablet 40 mEq, 40 mEq, Oral, BID  ticagrelor (BRILINTA) tablet 90 mg, 90 mg, Oral, BID         Labs:   Recent Labs     12/19/21  0011 12/19/21  0403   WBC 19.4* 16.9*   HGB 14.5 12.8*   HCT 43.1 37.9*    214     Recent Labs     12/19/21  0910 12/20/21  0405   * 134*   K 3.5 3.7   CO2 26 25   BUN 28* 22*   CREATININE 1.2 0.7*   LABGLOM >60 >60     No results for input(s): BNP in the last 72 hours. No results for input(s): PROTIME, INR in the last 72 hours.   Recent Labs     12/19/21  0011 12/19/21  0403 12/19/21  0910   TROPONINI 5.48* 4.36* 3.17*       Telemetry Monitor:   Sinus rhythm with V pacing      EKG:   Sinus rhythm with heart block inferior and anterolateral infarct    ECHO:  Bedside portable echo done by me in the Cath Lab: EF about 30 to 35% Wall motion preserved in this basal half of the septum anterior wall lateral wall and inferior wall    Corornary angiogram  & Intervention:  Ostial left main without clot and TERESSA grade II flow  RCA normal dominant  Of EDP 15  Placement of temporary pacing wire in the RV  Placement of intra-aortic balloon pump  Placement of Impella    ASSESSMENT AND PLAN:      Ostial left main thrombus with TERESSA grade I-II flow.  RCA was normal LVEDP was 15  EF prior to the procedure done at the bedside was about 30 to 35%  Completed large anterior lateral apical infarct  Cardiogenic shock  Sinus rhythm with complete heart block    Patient treated with emergent temporary pacemaker, intra-aortic balloon pump, Impella, balloon angioplasty and thrombectomy of the ostial left main clot. This was followed by treatment with Integrilin which he still is getting    Overnight he has been on intra-aortic balloon pump as well as Impella. He is also on Levophed blood pressure is in the 90s with maintenance of oxygenation. He is pacemaker dependent. He has had a decent urine output. Feels extremely thirsty despite getting fluids at 150 cc an hour. Plan is to remove the intra-aortic balloon pump and leave him on the Impella device. The patient appears to be still dry and we will give him another 1,000 cc bolus and continue fluids at 200 cc an hour. An echocardiogram is pending    He is maintaining his kidney functions  Lactic acid level and BNP is pending    I will add Brilinta to aspirin ; continue Levophed Integrilin and heparin      12/20/2021  Patient on Impella  Balloon pump removed yesterday  Patient is conducting on his own and not requiring pacing: We will discontinue pacemaker tomorrow  He is on Integrilin and heparin drip. Levophed discontinued  Patient was getting fluid was discontinued a few hours ago. His urine is still dark and there is no sign of hypoxia or CHF so we will continue with 200 cc an hour of fluids    Have asked Dr. Drake Ayon to help manage this patient. We will make a decision about discontinuation of Impella. Patient on aspirin Brilinta, heparin and Integrilin      Autumn FLORES  12/20/2021

## 2021-12-20 NOTE — PROGRESS NOTES
Dr. Faisal Lopez at bedside to evaluate patient. Impella performance level decreased to P-5 from P-6 per Dr. Faisal Lopez. Will order limited ECHO and draw VBG in 1 hour at new performance level. See orders.     Electronically signed by Peng Paz RN on 12/20/2021 at 12:49 PM

## 2021-12-20 NOTE — PROGRESS NOTES
Clinical Pharmacy Note  Heparin Dosing - Impella Device    Patient receiving heparin purge solution via Impella device. Consult received from Dr. Luis Alva to titrate systemic heparin to maintain -180. Shift ACT Results and Heparin Adjustments:      Date   Time POC ACT  Result Heparin  Bolus   (units) Systemic Heparin Infusion Rate (100 units/mL)   12/19/21 2100 169  800 units/hr    2200 167  800 units/hr    2300 170  800 units/hr   12/20/21 0000 166  800 units/hr    0100 159  850 units/hr    0200 157  900 units/hr    0300 161  900 units/hr    0400 170  900 units/hr    0500 160  900 units/hr    0600 167  900 units/hr    0700 161  900 units/hr       Pharmacy will continue to monitor and adjust based on ACT results.     Yamileth Bowman, [facility]  12/20/2021 7:05 AM

## 2021-12-21 LAB
ANION GAP SERPL CALCULATED.3IONS-SCNC: 6 MMOL/L (ref 3–16)
BASOPHILS ABSOLUTE: 0 K/UL (ref 0–0.2)
BASOPHILS RELATIVE PERCENT: 0.3 %
BUN BLDV-MCNC: 14 MG/DL (ref 7–20)
CALCIUM SERPL-MCNC: 7.6 MG/DL (ref 8.3–10.6)
CHLORIDE BLD-SCNC: 106 MMOL/L (ref 99–110)
CO2: 23 MMOL/L (ref 21–32)
CREAT SERPL-MCNC: 0.7 MG/DL (ref 0.9–1.3)
EKG ATRIAL RATE: 158 BPM
EKG DIAGNOSIS: NORMAL
EKG P-R INTERVAL: 134 MS
EKG Q-T INTERVAL: 344 MS
EKG QRS DURATION: 140 MS
EKG QTC CALCULATION (BAZETT): 557 MS
EKG R AXIS: 166 DEGREES
EKG T AXIS: -19 DEGREES
EKG VENTRICULAR RATE: 158 BPM
EOSINOPHILS ABSOLUTE: 0.2 K/UL (ref 0–0.6)
EOSINOPHILS RELATIVE PERCENT: 2 %
GFR AFRICAN AMERICAN: >60
GFR NON-AFRICAN AMERICAN: >60
GLUCOSE BLD-MCNC: 96 MG/DL (ref 70–99)
HCT VFR BLD CALC: 26.8 % (ref 40.5–52.5)
HEMOGLOBIN: 9.1 G/DL (ref 13.5–17.5)
LYMPHOCYTES ABSOLUTE: 1.6 K/UL (ref 1–5.1)
LYMPHOCYTES RELATIVE PERCENT: 18.1 %
MAGNESIUM: 1.8 MG/DL (ref 1.8–2.4)
MCH RBC QN AUTO: 31.9 PG (ref 26–34)
MCHC RBC AUTO-ENTMCNC: 33.8 G/DL (ref 31–36)
MCV RBC AUTO: 94.3 FL (ref 80–100)
MONOCYTES ABSOLUTE: 1 K/UL (ref 0–1.3)
MONOCYTES RELATIVE PERCENT: 10.7 %
NEUTROPHILS ABSOLUTE: 6.3 K/UL (ref 1.7–7.7)
NEUTROPHILS RELATIVE PERCENT: 68.9 %
PDW BLD-RTO: 13.8 % (ref 12.4–15.4)
PHOSPHORUS: 2.2 MG/DL (ref 2.5–4.9)
PLATELET # BLD: 146 K/UL (ref 135–450)
PMV BLD AUTO: 7.9 FL (ref 5–10.5)
POC ACT LR: 140 SEC
POC ACT LR: 144 SEC
POC ACT LR: 153 SEC
POC ACT LR: 160 SEC
POC ACT LR: 161 SEC
POC ACT LR: 161 SEC
POC ACT LR: 163 SEC
POC ACT LR: 165 SEC
POC ACT LR: 165 SEC
POC ACT LR: 167 SEC
POC ACT LR: 168 SEC
POC ACT LR: 172 SEC
POC ACT LR: 173 SEC
POC ACT LR: 174 SEC
POC ACT LR: 176 SEC
POC ACT LR: 290 SEC
POTASSIUM SERPL-SCNC: 3.9 MMOL/L (ref 3.5–5.1)
RBC # BLD: 2.84 M/UL (ref 4.2–5.9)
SODIUM BLD-SCNC: 135 MMOL/L (ref 136–145)
WBC # BLD: 9.1 K/UL (ref 4–11)

## 2021-12-21 PROCEDURE — C1769 GUIDE WIRE: HCPCS

## 2021-12-21 PROCEDURE — 6360000002 HC RX W HCPCS: Performed by: NURSE PRACTITIONER

## 2021-12-21 PROCEDURE — 83735 ASSAY OF MAGNESIUM: CPT

## 2021-12-21 PROCEDURE — 2500000003 HC RX 250 WO HCPCS

## 2021-12-21 PROCEDURE — 93010 ELECTROCARDIOGRAM REPORT: CPT | Performed by: INTERNAL MEDICINE

## 2021-12-21 PROCEDURE — 33992 RMVL PERQ LEFT HEART VAD: CPT

## 2021-12-21 PROCEDURE — 80048 BASIC METABOLIC PNL TOTAL CA: CPT

## 2021-12-21 PROCEDURE — 6370000000 HC RX 637 (ALT 250 FOR IP): Performed by: INTERNAL MEDICINE

## 2021-12-21 PROCEDURE — 2580000003 HC RX 258: Performed by: INTERNAL MEDICINE

## 2021-12-21 PROCEDURE — 02PA3RZ REMOVAL OF SHORT-TERM EXTERNAL HEART ASSIST SYSTEM FROM HEART, PERCUTANEOUS APPROACH: ICD-10-PCS | Performed by: INTERNAL MEDICINE

## 2021-12-21 PROCEDURE — 2709999900 HC NON-CHARGEABLE SUPPLY

## 2021-12-21 PROCEDURE — 99152 MOD SED SAME PHYS/QHP 5/>YRS: CPT | Performed by: INTERNAL MEDICINE

## 2021-12-21 PROCEDURE — C1753 CATH, INTRAVAS ULTRASOUND: HCPCS

## 2021-12-21 PROCEDURE — 84100 ASSAY OF PHOSPHORUS: CPT

## 2021-12-21 PROCEDURE — 2500000003 HC RX 250 WO HCPCS: Performed by: NURSE PRACTITIONER

## 2021-12-21 PROCEDURE — 85025 COMPLETE CBC W/AUTO DIFF WBC: CPT

## 2021-12-21 PROCEDURE — 99153 MOD SED SAME PHYS/QHP EA: CPT

## 2021-12-21 PROCEDURE — 2580000003 HC RX 258: Performed by: NURSE PRACTITIONER

## 2021-12-21 PROCEDURE — 99152 MOD SED SAME PHYS/QHP 5/>YRS: CPT

## 2021-12-21 PROCEDURE — 33992 RMVL PERQ LEFT HEART VAD: CPT | Performed by: INTERNAL MEDICINE

## 2021-12-21 PROCEDURE — 93454 CORONARY ARTERY ANGIO S&I: CPT | Performed by: INTERNAL MEDICINE

## 2021-12-21 PROCEDURE — 93454 CORONARY ARTERY ANGIO S&I: CPT

## 2021-12-21 PROCEDURE — 2100000000 HC CCU R&B

## 2021-12-21 PROCEDURE — C1725 CATH, TRANSLUMIN NON-LASER: HCPCS

## 2021-12-21 PROCEDURE — 85347 COAGULATION TIME ACTIVATED: CPT

## 2021-12-21 PROCEDURE — 2720000010 HC SURG SUPPLY STERILE

## 2021-12-21 PROCEDURE — 92978 ENDOLUMINL IVUS OCT C 1ST: CPT

## 2021-12-21 PROCEDURE — 6360000004 HC RX CONTRAST MEDICATION: Performed by: INTERNAL MEDICINE

## 2021-12-21 PROCEDURE — C1894 INTRO/SHEATH, NON-LASER: HCPCS

## 2021-12-21 PROCEDURE — 6360000002 HC RX W HCPCS

## 2021-12-21 PROCEDURE — 02C03ZZ EXTIRPATION OF MATTER FROM CORONARY ARTERY, ONE ARTERY, PERCUTANEOUS APPROACH: ICD-10-PCS | Performed by: INTERNAL MEDICINE

## 2021-12-21 PROCEDURE — 6360000002 HC RX W HCPCS: Performed by: INTERNAL MEDICINE

## 2021-12-21 PROCEDURE — 92978 ENDOLUMINL IVUS OCT C 1ST: CPT | Performed by: INTERNAL MEDICINE

## 2021-12-21 RX ORDER — AMIODARONE HYDROCHLORIDE 200 MG/1
200 TABLET ORAL 2 TIMES DAILY
Status: DISCONTINUED | OUTPATIENT
Start: 2021-12-21 | End: 2021-12-23 | Stop reason: HOSPADM

## 2021-12-21 RX ORDER — METOPROLOL SUCCINATE 25 MG/1
12.5 TABLET, EXTENDED RELEASE ORAL DAILY
Status: DISCONTINUED | OUTPATIENT
Start: 2021-12-21 | End: 2021-12-23 | Stop reason: HOSPADM

## 2021-12-21 RX ORDER — SODIUM CHLORIDE 9 MG/ML
25 INJECTION, SOLUTION INTRAVENOUS PRN
Status: DISCONTINUED | OUTPATIENT
Start: 2021-12-21 | End: 2021-12-23 | Stop reason: HOSPADM

## 2021-12-21 RX ORDER — SODIUM CHLORIDE 0.9 % (FLUSH) 0.9 %
5-40 SYRINGE (ML) INJECTION EVERY 12 HOURS SCHEDULED
Status: DISCONTINUED | OUTPATIENT
Start: 2021-12-21 | End: 2021-12-23 | Stop reason: HOSPADM

## 2021-12-21 RX ORDER — SODIUM CHLORIDE 0.9 % (FLUSH) 0.9 %
5-40 SYRINGE (ML) INJECTION PRN
Status: DISCONTINUED | OUTPATIENT
Start: 2021-12-21 | End: 2021-12-23 | Stop reason: HOSPADM

## 2021-12-21 RX ORDER — ACETAMINOPHEN 325 MG/1
650 TABLET ORAL EVERY 4 HOURS PRN
Status: DISCONTINUED | OUTPATIENT
Start: 2021-12-21 | End: 2021-12-23 | Stop reason: HOSPADM

## 2021-12-21 RX ADMIN — MORPHINE SULFATE 4 MG: 4 INJECTION, SOLUTION INTRAMUSCULAR; INTRAVENOUS at 01:58

## 2021-12-21 RX ADMIN — ASPIRIN 81 MG: 81 TABLET, CHEWABLE ORAL at 09:08

## 2021-12-21 RX ADMIN — ATORVASTATIN CALCIUM 40 MG: 40 TABLET, FILM COATED ORAL at 21:37

## 2021-12-21 RX ADMIN — METOPROLOL SUCCINATE 12.5 MG: 25 TABLET, EXTENDED RELEASE ORAL at 12:34

## 2021-12-21 RX ADMIN — AMIODARONE HYDROCHLORIDE 1 MG/MIN: 50 INJECTION, SOLUTION INTRAVENOUS at 04:17

## 2021-12-21 RX ADMIN — IOPAMIDOL 105 ML: 755 INJECTION, SOLUTION INTRAVENOUS at 11:38

## 2021-12-21 RX ADMIN — SODIUM CHLORIDE, PRESERVATIVE FREE 10 ML: 5 INJECTION INTRAVENOUS at 21:38

## 2021-12-21 RX ADMIN — SODIUM PHOSPHATE, MONOBASIC, MONOHYDRATE 30.69 MMOL: 276; 142 INJECTION, SOLUTION INTRAVENOUS at 00:05

## 2021-12-21 RX ADMIN — POTASSIUM CHLORIDE 40 MEQ: 750 TABLET, FILM COATED, EXTENDED RELEASE ORAL at 21:35

## 2021-12-21 RX ADMIN — Medication 6 MG: at 21:36

## 2021-12-21 RX ADMIN — MORPHINE SULFATE 4 MG: 4 INJECTION, SOLUTION INTRAMUSCULAR; INTRAVENOUS at 09:08

## 2021-12-21 RX ADMIN — TICAGRELOR 90 MG: 90 TABLET ORAL at 21:37

## 2021-12-21 RX ADMIN — TICAGRELOR 90 MG: 90 TABLET ORAL at 09:08

## 2021-12-21 RX ADMIN — SODIUM CHLORIDE 25 ML: 9 INJECTION, SOLUTION INTRAVENOUS at 09:03

## 2021-12-21 RX ADMIN — HYDROCODONE BITARTRATE AND ACETAMINOPHEN 2 TABLET: 5; 325 TABLET ORAL at 06:29

## 2021-12-21 RX ADMIN — AMIODARONE HYDROCHLORIDE 200 MG: 200 TABLET ORAL at 21:36

## 2021-12-21 RX ADMIN — SODIUM CHLORIDE 25 ML: 9 INJECTION, SOLUTION INTRAVENOUS at 00:04

## 2021-12-21 RX ADMIN — SODIUM CHLORIDE: 9 INJECTION, SOLUTION INTRAVENOUS at 14:57

## 2021-12-21 RX ADMIN — POTASSIUM CHLORIDE 40 MEQ: 750 TABLET, FILM COATED, EXTENDED RELEASE ORAL at 09:08

## 2021-12-21 RX ADMIN — SODIUM CHLORIDE: 9 INJECTION, SOLUTION INTRAVENOUS at 20:22

## 2021-12-21 RX ADMIN — HYDROCODONE BITARTRATE AND ACETAMINOPHEN 2 TABLET: 5; 325 TABLET ORAL at 21:36

## 2021-12-21 RX ADMIN — SODIUM CHLORIDE: 9 INJECTION, SOLUTION INTRAVENOUS at 04:42

## 2021-12-21 RX ADMIN — HYDROCODONE BITARTRATE AND ACETAMINOPHEN 2 TABLET: 5; 325 TABLET ORAL at 17:20

## 2021-12-21 RX ADMIN — Medication 10 ML: at 09:09

## 2021-12-21 RX ADMIN — SODIUM PHOSPHATE, MONOBASIC, MONOHYDRATE 15.33 MMOL: 276; 142 INJECTION, SOLUTION INTRAVENOUS at 09:04

## 2021-12-21 RX ADMIN — AMIODARONE HYDROCHLORIDE 1 MG/MIN: 50 INJECTION, SOLUTION INTRAVENOUS at 12:34

## 2021-12-21 ASSESSMENT — PAIN - FUNCTIONAL ASSESSMENT
PAIN_FUNCTIONAL_ASSESSMENT: PREVENTS OR INTERFERES SOME ACTIVE ACTIVITIES AND ADLS

## 2021-12-21 ASSESSMENT — PAIN SCALES - GENERAL
PAINLEVEL_OUTOF10: 7
PAINLEVEL_OUTOF10: 6
PAINLEVEL_OUTOF10: 9
PAINLEVEL_OUTOF10: 8
PAINLEVEL_OUTOF10: 6
PAINLEVEL_OUTOF10: 8

## 2021-12-21 ASSESSMENT — PAIN DESCRIPTION - PROGRESSION
CLINICAL_PROGRESSION: NOT CHANGED

## 2021-12-21 ASSESSMENT — PAIN DESCRIPTION - PAIN TYPE
TYPE: CHRONIC PAIN

## 2021-12-21 ASSESSMENT — PAIN DESCRIPTION - ORIENTATION
ORIENTATION: LOWER

## 2021-12-21 ASSESSMENT — PAIN DESCRIPTION - DESCRIPTORS
DESCRIPTORS: ACHING;CONSTANT
DESCRIPTORS: ACHING
DESCRIPTORS: ACHING;SORE
DESCRIPTORS: ACHING;SORE

## 2021-12-21 ASSESSMENT — PAIN DESCRIPTION - ONSET
ONSET: ON-GOING

## 2021-12-21 ASSESSMENT — PAIN DESCRIPTION - FREQUENCY
FREQUENCY: CONTINUOUS

## 2021-12-21 ASSESSMENT — PAIN DESCRIPTION - LOCATION
LOCATION: BACK

## 2021-12-21 NOTE — CARE COORDINATION
INITIAL CASE MANAGEMENT ASSESSMENT    Met with patient to assess possible discharge needs. Explained Case Management role/services. Living Situation: Lives in a house with No [place], with Spouse Hannah Keita ( not ) and children. ADLs: Independent     DME: None    PT/OT Recs: TBD     Active Services: None     Transportation: Active - family will transport home. Medications: Alexandra Villalobos    PCP: [address] [place] [address]      HD/PD: N/A    PLAN/COMMENTS: Return home. Provided Home Care and SNF Lists. Provided contact information for patient or family to call with any questions. Will follow and assist as needed. The Plan for Transition of Care is related to the following treatment goals: Return home     The Patient and/or patient representative - Jesus Ayoub was provided with a choice of provider and agrees   with the discharge plan. [x] Yes [] No    Freedom of choice list was provided with basic dialogue that supports the patient's individualized plan of care/goals, treatment preferences and shares the quality data associated with the providers. [x] Yes [] No  Advanced Care Planning Completed.   Electronically signed by PEDRO Buckley on 12/21/2021 at 1:41 PM

## 2021-12-21 NOTE — CONSULTS
[address] [place] [address]  CARDIOPULMONARY PHASE I CONSULT        NAME:  Abraham Leon  MEDICAL RECORD NUMBER:  [id number]  AGE: 55 y.o.    GENDER: male  : 1965  TODAY'S DATE:  2021    Subjective:     VISIT TYPE: evaluation     ADMITTING PHYSICIAN:  Joshua Adan MD     PAST MEDICAL HISTORY        Diagnosis Date    Hypertension        SOCIAL HISTORY    Social History     Tobacco Use    Smoking status: Not on file    Smokeless tobacco: Not on file   Substance Use Topics    Alcohol use: Not on file    Drug use: Not on file       ALLERGIES    No Known Allergies    MEDICATIONS  Scheduled Meds:   sodium chloride flush  5-40 mL IntraVENous 2 times per day    sodium chloride flush  5-40 mL IntraVENous 2 times per day    metoprolol succinate  12.5 mg Oral Daily    atorvastatin  40 mg Oral Nightly    aspirin  81 mg Oral Daily    potassium chloride  40 mEq Oral BID    ticagrelor  90 mg Oral BID       ADMIT DATE: 2021      Objective:     ADMISSION DIAGNOSIS:   Shortness of breath [R06.02]  Abdominal pain, epigastric [R10.13]  ST elevation myocardial infarction (STEMI), unspecified artery (HCC) [I21.3]  Chest pain, unspecified type [R07.9]  Non-intractable vomiting with nausea, unspecified vomiting type [R11.2]  NSTEMI (non-ST elevated myocardial infarction) (HCC) [I21.4]  Cardiogenic shock (HCC) [R57.0]     /64   Pulse 84   Temp 98.2 °F (36.8 °C) (Oral)   Resp 18   Ht 6' 1\" (1.854 m)   Wt 214 lb 8.1 oz (97.3 kg)   SpO2 100%   BMI 28.30 kg/m²     ADMIT:  Weight: 200 lb (90.7 kg)    TODAY: Weight: 214 lb 8.1 oz (97.3 kg)    Wt Readings from Last 3 Encounters:   21 214 lb 8.1 oz (97.3 kg)        ECHOCARDIOGRAM:     HgBA1c:  No components found for: HGBA1C  LIPID PANEL:  No results found for: CHOL, HDL, TRIG     Assessment:     CONSULTS:   IP CONSULT TO CARDIAC REHAB  IP CONSULT TO CARDIAC REHAB    Patient has a CARDIOLOGY CONSULT: Yes        EDUCATION STATUS: Patient and Other: Mother   [x]  Provided both written and verbal education on Cardiopulmonary Rehabilitation. []  Provided instructions for smoking cessation programs. []  Provided education of CAD risk factors. []  Provided recommendations on activity and exercise. []  Provided education on medications. []  Provided education on coronary anatomy and coronary interventions. []  Other:    CURRENT DIET: ADULT DIET; Regular; No Added Salt (3-4 gm)    EDUCATIONAL PACKETS PROVIDED- PRINTED FROM [employer]. Titles and material given:  No   []  Managing Heart Disease and Preventing Stroke  []  Heart Owner's Manual  []  Living Well with Heart Failure  []  Other:     PATIENT/CAREGIVER TEACHING:  Level of patient/caregiver understanding able to:   [x] Verbalize understanding   [] Demonstrate understanding       [] Teach back        [] Needs reinforcement     []  Other:       TEACHING TIME:  10      Plan:       DISCHARGE PLAN:  Placement for patient upon discharge: home with support   Hospice Care:  no  Code Status: Full Code  Discharge appointment scheduled: No     RECOMMENDATIONS:   [x]  Patient/Family instructed to call Cardiopulmonary Rehab ([phone]) upon discharge schedule the initial evaluation  [x]  Encourage to call Cardiopulmonary Rehabilitation with any questions. []  Referral to alternate Cardiopulmonary Rehabilitation facility.    []  Other:    [] Appointment scheduled for   [] Chooses to not schedule at this time          Electronically signed by Adriane Simeon RN,MS on 12/21/2021 at 2:52 PM

## 2021-12-21 NOTE — PLAN OF CARE
Problem: Pain:  Description: Pain management should include both nonpharmacologic and pharmacologic interventions.   Goal: Pain level will decrease  Description: Pain level will decrease  12/21/2021 1616 by Rhett Warren RN  Outcome: Ongoing  12/21/2021 0726 by Edmundo Ruiz RN  Outcome: Ongoing  Goal: Control of acute pain  Description: Control of acute pain  12/21/2021 1616 by Rhett Warren RN  Outcome: Ongoing  12/21/2021 0726 by Edmundo Ruiz RN  Outcome: Ongoing  Goal: Control of chronic pain  Description: Control of chronic pain  12/21/2021 1616 by Rhett Warren RN  Outcome: Ongoing  12/21/2021 0726 by Edmundo Ruiz RN  Outcome: Ongoing     Problem: Skin Integrity:  Goal: Will show no infection signs and symptoms  Description: Will show no infection signs and symptoms  12/21/2021 1616 by Rhett Warren RN  Outcome: Ongoing  12/21/2021 0726 by Edmundo Ruiz RN  Outcome: Ongoing  Goal: Absence of new skin breakdown  Description: Absence of new skin breakdown  12/21/2021 1616 by Rhett Warren RN  Outcome: Ongoing  12/21/2021 0726 by Edmundo Ruiz RN  Outcome: Ongoing     Problem: Anxiety/Stress:  Goal: Level of anxiety will decrease  Description: Level of anxiety will decrease  12/21/2021 1616 by Rhett Warren RN  Outcome: Ongoing  12/21/2021 0726 by Edmundo Ruiz RN  Outcome: Ongoing     Problem: Cardiac Output - Decreased:  Goal: Hemodynamic stability will improve  Description: Hemodynamic stability will improve  12/21/2021 1616 by Rhett Warren RN  Outcome: Ongoing  12/21/2021 0726 by Edmundo Ruiz RN  Outcome: Ongoing

## 2021-12-21 NOTE — PROGRESS NOTES
ACT drawn and resulted 140. Patient instructed on removal procedure. Arterial sheath site without hematoma or oozing. Arterial sheath removed per policy without difficulty. Integrity of sheath inspected upon removal and no abnormalities noted. Manual pressure held 15 minutes. Dry, sterile tegaderm applied. Patient tolerated well. Vital signs, groin checks, and pedal pulses will be completed per protocol (every 15 minutes X 4, every 30 minutes X 2, and every hour X 2 per protocol). Sheath removed by  PEDRO LUIS Garcia RN. ACT drawn and resulted 140. Patient instructed on removal procedure. Venous sheath site without hematoma or oozing. Venous sheath removed per policy without difficulty. Integrity of sheath inspected upon removal and no abnormalities noted. Manual pressure held X 10 minutes. Dry, sterile tegaderm applied. Pressure dressing applied. Patient tolerated well. Vital signs, groin checks, and pedal pulses will be completed per protocol (every 15 minutes X 4, every 30 minutes X 2, and every hour X 2 per protocol). Sheath removed by  PEDRO LUIS Garcia RN.

## 2021-12-21 NOTE — PROGRESS NOTES
Clinical Pharmacy Note  Heparin Dosing - Impella Device    Patient receiving heparin purge solution via Impella device. Consult received from Dr. Janine Feliciano to titrate systemic heparin to maintain -180. Shift ACT Results and Heparin Adjustments:      Date   Time POC ACT  Result Heparin  Bolus   (units) Systemic Heparin Infusion Rate (100 units/mL)   12/20 1600 176  900 units/hr   12/20 1800 167  900 units/hr   12/20 2000 165  900 units/hr       Pharmacy will continue to monitor and adjust based on ACT results.   [address] [place] [address] 12/20/2021 8:54 PM

## 2021-12-21 NOTE — ACP (ADVANCE CARE PLANNING)
Advance Care Planning     Advance Care Planning Activator (Inpatient)  Conversation Note      Date of ACP Conversation: 12/21/2021     Conversation Conducted with: Patient with Decision Making Capacity    ACP Activator: Ibeth Solano, [address]:     Current Designated Health Care Decision Maker:     Primary Decision Maker: April Holloway - Domestic Partner - [phone]    Secondary Decision Maker: Patrick Hayes - Parent - [phone]    Today we documented Decision Maker(s) consistent with Legal Next of Kin hierarchy. Care Preferences    Ventilation: \"If you were in your present state of health and suddenly became very ill and were unable to breathe on your own, what would your preference be about the use of a ventilator (breathing machine) if it were available to you? \"      Would the patient desire the use of ventilator (breathing machine)?: yes    \"If your health worsens and it becomes clear that your chance of recovery is unlikely, what would your preference be about the use of a ventilator (breathing machine) if it were available to you? \"     Would the patient desire the use of ventilator (breathing machine)?: Yes      Resuscitation  \"CPR works best to restart the heart when there is a sudden event, like a heart attack, in someone who is otherwise healthy. Unfortunately, CPR does not typically restart the heart for people who have serious health conditions or who are very sick. \"    \"In the event your heart stopped as a result of an underlying serious health condition, would you want attempts to be made to restart your heart (answer \"yes\" for attempt to resuscitate) or would you prefer a natural death (answer \"no\" for do not attempt to resuscitate)? \" yes       [x] Yes   [] No   Educated Maizn / Dalton Song regarding differences between Advance Directives and portable DNR orders.     Length of ACP Conversation in minutes:  10  Conversation Outcomes:  [x] ACP discussion completed  [] Existing advance directive reviewed with patient; no changes to patient's previously recorded wishes  [] New Advance Directive completed  [] Portable Do Not Rescitate prepared for Provider review and signature  [] POLST/POST/MOLST/MOST prepared for Provider review and signature      Follow-up plan:    [] Schedule follow-up conversation to continue planning  [] Referred individual to Provider for additional questions/concerns   [] Advised patient/agent/surrogate to review completed ACP document and update if needed with changes in condition, patient preferences or care setting    [x] This note routed to one or more involved healthcare providers    Patient has been with Cassie Mariano for 16 yrs and considers her his wife. Encouraged him to complete Advanced Directives, naming her as his primary decision maker, patient declined. Patient's mother is legal next of Kin.

## 2021-12-21 NOTE — ANESTHESIA PRE-OP
Brief Pre-Op Note/Sedation Assessment      Jael Diaz  1965  [id number]  9:44 AM    Planned Procedure: Cardiac Catheterization Procedure  Post Procedure Plan: Return to same level of care  Consent: I have discussed with the patient and/or the patient representative the indication, alternatives, and the possible risks and/or complications of the planned procedure and the anesthesia methods. The patient and/or patient representative appear to understand and agree to proceed. Chief Complaint:   STEMI      Indications for Cath Procedure:  1. Presentation:  ACS > 24 hrs  2. Anginal Classification within 2 weeks:  CCS IV - Inability to perform any activity without angina or angina at rest, i.e., severe limitation  3. Angina Symptoms Assessment:  Typical Chest Pain  4. Heart Failure Class within last 2 weeks:  Yes:  Heart Failure Type: Systolic Severity:  Class II - Symptoms of HF on ordinary exertion  5. Cardiovascular Instability:  No    Prior Ischemic Workup/Eval:  1. Pre-Procedural Medications: Yes: Aspirin and STATIN  2. Stress Test Completed? No    Does Patient need surgery?   Cath Valve Surgery:  No    Pre-Procedure Medical History:  Vital Signs:  /72   Pulse 85   Temp 98.4 °F (36.9 °C) (Oral)   Resp 22   Ht 6' 1\" (1.854 m)   Wt 214 lb 8.1 oz (97.3 kg)   SpO2 97%   BMI 28.30 kg/m²     Allergies:  No Known Allergies  Medications:    Current Facility-Administered Medications   Medication Dose Route Frequency Provider Last Rate Last Admin    sodium chloride flush 0.9 % injection 5-40 mL  5-40 mL IntraVENous 2 times per day Mya Langford MD        sodium chloride flush 0.9 % injection 5-40 mL  5-40 mL IntraVENous PRN Mya Langford MD        0.9 % sodium chloride infusion  25 mL IntraVENous PRN Mya Langford MD        melatonin tablet 6 mg  6 mg Oral Nightly PRN Raquel Mercer MD   6 mg at 12/20/21 2001    amiodarone (CORDARONE) 450 mg in dextrose 5 % 250 mL infusion  1 mg/min IntraVENous Continuous Christina Alis, APRN - CNP 33.3 mL/hr at 12/21/21 0600 1 mg/min at 12/21/21 0600    magnesium sulfate 1000 mg in dextrose 5% 100 mL IVPB  1,000 mg IntraVENous PRN Christina Alis, APRN - CNP        sodium phosphate 15.33 mmol in dextrose 5 % 250 mL IVPB  0.16 mmol/kg IntraVENous PRN Christina Alis, APRN - CNP 62.5 mL/hr at 12/21/21 0904 15.33 mmol at 12/21/21 2825    Or    sodium phosphate 30.69 mmol in dextrose 5 % 250 mL IVPB  0.32 mmol/kg IntraVENous PRN Christina Alis, APRN - CNP   Stopped at 12/21/21 3951    sodium chloride flush 0.9 % injection 5-40 mL  5-40 mL IntraVENous 2 times per day Nini Pendleton MD   10 mL at 12/21/21 0909    sodium chloride flush 0.9 % injection 5-40 mL  5-40 mL IntraVENous PRN Nini Pendleton MD        0.9 % sodium chloride infusion  25 mL IntraVENous PRN Nini Pendleton  mL/hr at 12/21/21 0903 25 mL at 12/21/21 0903    acetaminophen (TYLENOL) tablet 650 mg  650 mg Oral Q4H PRN Nini Pendleton MD        0.9 % sodium chloride bolus  500 mL IntraVENous PRN Nini Pendleton MD        ondansetron [facility]) injection 4 mg  4 mg IntraVENous Q6H PRN Nini Pendleton MD        atorvastatin (LIPITOR) tablet 40 mg  40 mg Oral Nightly Nini Pendleton MD   40 mg at 12/20/21 2001    0.9 % sodium chloride infusion   IntraVENous Continuous Nini Pendleton  mL/hr at 12/21/21 0600 Rate Verify at 12/21/21 0600    HYDROcodone-acetaminophen (NORCO) 5-325 MG per tablet 1 tablet  1 tablet Oral Q4H PRN Nini Pendleton MD        Or    HYDROcodone-acetaminophen (NORCO) 5-325 MG per tablet 2 tablet  2 tablet Oral Q4H PRN Nini Pendleton MD   2 tablet at 12/21/21 0629    morphine (PF) injection 4 mg  4 mg IntraVENous Q2H PRN Nini Pendleton MD   4 mg at 12/21/21 0908    LORazepam (ATIVAN) injection 1 mg  1 mg IntraVENous Q4H PRN Nini Pendleton MD   1 mg at 12/20/21 2001    aspirin chewable tablet 81 mg  81 mg Oral Daily Nini Pendleton MD   81 mg at 12/21/21 0908    norepinephrine (LEVOPHED) 16 mg in dextrose 5% 250 mL infusion  2-30 mcg/min IntraVENous Continuous Nini Pendleton MD   Stopped at 12/20/21 0130    heparin (porcine) 25,000 Units in dextrose 5 % 500 mL infusion (FOR IMPELLA PURGE)   IntraCATHeter Continuous Nini Pendleton MD   New Bag at 12/20/21 1942    heparin 25,000 units in dextrose 5% 250 mL (premix) infusion  0-3,000 Units/hr IntraVENous Continuous Nini Pendleton MD 9 mL/hr at 12/21/21 0600 900 Units/hr at 12/21/21 0600    potassium chloride (KLOR-CON) extended release tablet 40 mEq  40 mEq Oral BID Nini Pendleton MD   40 mEq at 12/21/21 0908    ticagrelor (BRILINTA) tablet 90 mg  90 mg Oral BID Nini Pendleton MD   90 mg at 12/21/21 0908       Past Medical History:    Past Medical History:   Diagnosis Date    Hypertension        Surgical History:  No past surgical history on file. Pre-Sedation:  Pre-Sedation Documentation and Exam:  I have personally completed a history, physical exam & review of systems for this patient (see notes). Prior History of Anesthesia Complications:   none    Modified Mallampati:  II (soft palate, uvula, fauces visible)    ASA Classification:  Class 3 - A patient with severe systemic disease that limits activity but is not incapacitating    Arianne Scale: Activity:  2 - Able to move 4 extremities voluntarily on command  Respiration:  2 - Able to breathe deeply and cough freely  Circulation:  2 - BP+/- 20mmHg of normal  Consciousness:  2 - Fully awake  Oxygen Saturation (color):  2 - Able to maintain oxygen saturation >92% on room air    Sedation/Anesthesia Plan:  Guard the patient's safety and welfare. Minimize physical discomfort and pain. Minimize negative psychological responses to treatment by providing sedation and analgesia and maximize the potential amnesia. Patient to meet pre-procedure discharge plan.     Medication Planned:  midazolam intravenously and fentanyl intravenously    Patient is an appropriate candidate for plan of sedation:   yes      Electronically signed by Anabela Dinh MD on 12/21/2021 at 9:44 AM

## 2021-12-21 NOTE — PROGRESS NOTES
Bedside handoff given to Queenie Luna RN and Sherron Sena RN. Pt transported to cath lab for Impella and TVP removal.  Amiodarone, Heparin, and IVF continue to infuse. Pt will be returning to CVU, 1305 after procedure.     Electronically signed by Danielle Del Rosario RN on 12/21/2021 at 9:36 AM

## 2021-12-21 NOTE — PROCEDURES
catheter. We then performed a left femoral arteriogram from the sheath port. This  was the size of approximately 9 mm of the common femoral.  We went up  with the [address] [place] [address] Avenue, the 0.035 size, and used the JOSE  catheter and wire down up and over into the right superficial femoral  artery. We advanced a 9 mm x 40-mm balloon up towards the aorta. We  then placed the Impella device on P2 and pulled it back across the  aortic valve. The device was then turned off and removed out through  the sheath port. The 9-mm balloon had been advanced up and over across  the common femoral artery on the right and was inflated to a nominal  diameter. This achieved hemostasis. After a total of approximately 30  minutes of intermittent balloon tamponade with reversal of the ACT using  IV protamine, we did achieve hemostasis. The left femoral arterial  sheath will be removed in the CVU and manual pressure applied for  hemostasis. There were no complications from the procedure. Estimated  blood loss was less than 50 mL. At the same time, the transvenous  pacemaker was removed out through the right femoral venous sheath. This  will also be removed in the recovery area and manual pressure applied  for hemostasis. Moderate sedation was administered for the procedure. The patient had  an ASA grade of III and a Mallampati score of II. He received 2 mg of  IV Versed and 100 mcg of fentanyl. Total duration of sedation was 78  minutes. Vital signs were monitored throughout the sedation period and  remained stable. There were no complications from sedation. Sedation  was administered by an independent agent at my direction and  supervision. I was present the entire time during the case. FINDINGS:  1. Some residual thrombus in the left main on angiography with a  residual 30% eccentric plaque. The residual thrombus underwent  mechanical aspiration thrombectomy resulting in zero thrombus burden.    The circumflex and the proximal and mid LAD are free of disease. The  very distal LAD at the apex is occluded chronically. The right coronary  artery was not selectively injected. The findings were confirmed by  intravascular ultrasound. 2.  Successful balloon tamponade and removal of a left ventricular  Impella device percutaneous. This had been placed for cardiogenic shock  in the setting of an acute anterior myocardial infarction.         Jeanette Nina MD    D: 12/21/2021 11:33:29       T: 12/21/2021 11:36:36     [id number]  Job#: [id number]     Doc#: [id number]    CC:  Quinton Kincaid MD

## 2021-12-21 NOTE — PROGRESS NOTES
This RN and another staff RN attempted to get pt OOB to use the bathroom. Upon standing on side of bed previous right arterial puncture site where Impella device had been started bleeding. Immediate manual pressure began being held by Allan Gonzalez RN and pt assisted back into bed simultaneously. Other RN's at bedside to help connect BP cuff and pulse oximetry. Pt never loss consciousness. Remained alert and oriented during entire time. SR on monitor. Pt denied chest pain, nausea and SOB during this. Manual pressure held for 20 minutes. D-STAT applied with Tegaderm. RN will monitor closely.

## 2021-12-21 NOTE — PROGRESS NOTES
Clinical Pharmacy Note  Heparin Dosing - Impella Device    Patient receiving heparin purge solution via Impella device. Consult received from Dr. Mary Arndt to titrate systemic heparin to maintain -180. Shift ACT Results and Heparin Adjustments:      Date   Time POC ACT  Result Heparin  Bolus   (units) Systemic Heparin Infusion Rate (100 units/mL)   12/20 2200 160  900 units/hr   12/21 0000 167  900 units/hr   12/21 0200 172  900 units/hr               12/21        0400       176                              900 units/hr              12/21        0600       161                              900 units/hr     Pharmacy will continue to monitor and adjust based on ACT results.   Kevin Fay, [facility], [address] 12/21/2021 5:57 AM

## 2021-12-21 NOTE — PLAN OF CARE
Problem: Pain:  Goal: Pain level will decrease  Outcome: Ongoing  Goal: Control of acute pain  Outcome: Ongoing  Goal: Control of chronic pain  Outcome: Ongoing     Problem: Skin Integrity:  Goal: Will show no infection signs and symptoms  Outcome: Ongoing  Goal: Absence of new skin breakdown  Outcome: Ongoing     Problem: Anxiety/Stress:  Goal: Level of anxiety will decrease  Outcome: Ongoing     Problem: Cardiac Output - Decreased:  Goal: Hemodynamic stability will improve  Outcome: Ongoing

## 2021-12-21 NOTE — PROGRESS NOTES
Late entry due to patient care. While giving evening meds to patient, pt began to have sustained runs of a wide complex tachycardia with rates in the 140s-170s. While discussing this with pt this has been happening to him for \"since I have been young\"  Pt states he can feel it when it is happening and can usually take a few deep breathes and make it stop. The pt did not share this history with his current medical team but has wore a halter monitor 2 years ago which did not capture any events per the patient. This RN called on call service and spoke to [facility]. Orders for electrolyte placement protocol, amio gtt and bolus. ECG taken to capture rhythm.

## 2021-12-21 NOTE — PROGRESS NOTES
Patient returned to [facility] 181, 5835 from cath lab. Bedside report received from [facility]. Pt awake/alert. Follows commands appropriately. SR on monitor. Pt on room air. R femoral venous sheath intact and in place. Site free of hematoma, oozing and bleeding. Palpable pedal and post-tib pulses noted. L femoral arterial sheath intact and in place. No hematoma, oozing or bleeding. Palpable pedal and post-tib pulses noted as well. Fay catheter in place. Clear yellow urine. Amiodarone gtt infusing @ 1 mg/min and NS IVF infusing @ 200 mL/hr.  Pt's mother arrived at bedside. RN provided updates and questions answered. Pt instructed on strict bedrest and bleeding precautions, verbalizes understanding. Will monitor.     Electronically signed by Carlyn Song RN on 12/21/2021 at 11:48 AM

## 2021-12-22 LAB
ANION GAP SERPL CALCULATED.3IONS-SCNC: 7 MMOL/L (ref 3–16)
BASOPHILS ABSOLUTE: 0 K/UL (ref 0–0.2)
BASOPHILS RELATIVE PERCENT: 0.4 %
BUN BLDV-MCNC: 8 MG/DL (ref 7–20)
CALCIUM SERPL-MCNC: 8.3 MG/DL (ref 8.3–10.6)
CHLORIDE BLD-SCNC: 109 MMOL/L (ref 99–110)
CO2: 21 MMOL/L (ref 21–32)
CREAT SERPL-MCNC: 0.6 MG/DL (ref 0.9–1.3)
EOSINOPHILS ABSOLUTE: 0.3 K/UL (ref 0–0.6)
EOSINOPHILS RELATIVE PERCENT: 3 %
GFR AFRICAN AMERICAN: >60
GFR NON-AFRICAN AMERICAN: >60
GLUCOSE BLD-MCNC: 93 MG/DL (ref 70–99)
HCT VFR BLD CALC: 27.3 % (ref 40.5–52.5)
HEMOGLOBIN: 9.2 G/DL (ref 13.5–17.5)
LYMPHOCYTES ABSOLUTE: 1.9 K/UL (ref 1–5.1)
LYMPHOCYTES RELATIVE PERCENT: 16.4 %
MAGNESIUM: 1.8 MG/DL (ref 1.8–2.4)
MCH RBC QN AUTO: 31.9 PG (ref 26–34)
MCHC RBC AUTO-ENTMCNC: 33.8 G/DL (ref 31–36)
MCV RBC AUTO: 94.4 FL (ref 80–100)
MONOCYTES ABSOLUTE: 1 K/UL (ref 0–1.3)
MONOCYTES RELATIVE PERCENT: 9.1 %
NEUTROPHILS ABSOLUTE: 8.1 K/UL (ref 1.7–7.7)
NEUTROPHILS RELATIVE PERCENT: 71.1 %
PDW BLD-RTO: 13.9 % (ref 12.4–15.4)
PHOSPHORUS: 2.5 MG/DL (ref 2.5–4.9)
PLATELET # BLD: 227 K/UL (ref 135–450)
PMV BLD AUTO: 7.6 FL (ref 5–10.5)
POC ACT LR: 148 SEC
POTASSIUM SERPL-SCNC: 4.6 MMOL/L (ref 3.5–5.1)
RBC # BLD: 2.89 M/UL (ref 4.2–5.9)
SODIUM BLD-SCNC: 137 MMOL/L (ref 136–145)
WBC # BLD: 11.3 K/UL (ref 4–11)

## 2021-12-22 PROCEDURE — 99233 SBSQ HOSP IP/OBS HIGH 50: CPT | Performed by: INTERNAL MEDICINE

## 2021-12-22 PROCEDURE — 93308 TTE F-UP OR LMTD: CPT

## 2021-12-22 PROCEDURE — 36415 COLL VENOUS BLD VENIPUNCTURE: CPT

## 2021-12-22 PROCEDURE — 94760 N-INVAS EAR/PLS OXIMETRY 1: CPT

## 2021-12-22 PROCEDURE — 85025 COMPLETE CBC W/AUTO DIFF WBC: CPT

## 2021-12-22 PROCEDURE — 2100000000 HC CCU R&B

## 2021-12-22 PROCEDURE — 83735 ASSAY OF MAGNESIUM: CPT

## 2021-12-22 PROCEDURE — 6370000000 HC RX 637 (ALT 250 FOR IP): Performed by: INTERNAL MEDICINE

## 2021-12-22 PROCEDURE — 84100 ASSAY OF PHOSPHORUS: CPT

## 2021-12-22 PROCEDURE — 2580000003 HC RX 258: Performed by: INTERNAL MEDICINE

## 2021-12-22 PROCEDURE — 80048 BASIC METABOLIC PNL TOTAL CA: CPT

## 2021-12-22 RX ORDER — LISINOPRIL 5 MG/1
2.5 TABLET ORAL EVERY EVENING
Status: DISCONTINUED | OUTPATIENT
Start: 2021-12-22 | End: 2021-12-23 | Stop reason: HOSPADM

## 2021-12-22 RX ADMIN — RIVAROXABAN 20 MG: 20 TABLET, FILM COATED ORAL at 17:52

## 2021-12-22 RX ADMIN — HYDROCODONE BITARTRATE AND ACETAMINOPHEN 2 TABLET: 5; 325 TABLET ORAL at 17:54

## 2021-12-22 RX ADMIN — AMIODARONE HYDROCHLORIDE 200 MG: 200 TABLET ORAL at 08:32

## 2021-12-22 RX ADMIN — SODIUM CHLORIDE: 9 INJECTION, SOLUTION INTRAVENOUS at 05:55

## 2021-12-22 RX ADMIN — POTASSIUM CHLORIDE 40 MEQ: 750 TABLET, FILM COATED, EXTENDED RELEASE ORAL at 08:32

## 2021-12-22 RX ADMIN — METOPROLOL SUCCINATE 12.5 MG: 25 TABLET, EXTENDED RELEASE ORAL at 08:32

## 2021-12-22 RX ADMIN — AMIODARONE HYDROCHLORIDE 200 MG: 200 TABLET ORAL at 21:44

## 2021-12-22 RX ADMIN — TICAGRELOR 90 MG: 90 TABLET ORAL at 08:32

## 2021-12-22 RX ADMIN — HYDROCODONE BITARTRATE AND ACETAMINOPHEN 2 TABLET: 5; 325 TABLET ORAL at 21:44

## 2021-12-22 RX ADMIN — POTASSIUM CHLORIDE 40 MEQ: 750 TABLET, FILM COATED, EXTENDED RELEASE ORAL at 21:44

## 2021-12-22 RX ADMIN — SODIUM CHLORIDE, PRESERVATIVE FREE 10 ML: 5 INJECTION INTRAVENOUS at 08:34

## 2021-12-22 RX ADMIN — SODIUM CHLORIDE: 9 INJECTION, SOLUTION INTRAVENOUS at 01:36

## 2021-12-22 RX ADMIN — TICAGRELOR 90 MG: 90 TABLET ORAL at 21:44

## 2021-12-22 RX ADMIN — ASPIRIN 81 MG: 81 TABLET, CHEWABLE ORAL at 08:32

## 2021-12-22 RX ADMIN — LISINOPRIL 2.5 MG: 5 TABLET ORAL at 17:52

## 2021-12-22 RX ADMIN — ATORVASTATIN CALCIUM 40 MG: 40 TABLET, FILM COATED ORAL at 21:44

## 2021-12-22 ASSESSMENT — PAIN DESCRIPTION - DESCRIPTORS
DESCRIPTORS: ACHING
DESCRIPTORS: ACHING

## 2021-12-22 ASSESSMENT — PAIN DESCRIPTION - ONSET
ONSET: ON-GOING
ONSET: ON-GOING

## 2021-12-22 ASSESSMENT — PAIN SCALES - GENERAL
PAINLEVEL_OUTOF10: 7
PAINLEVEL_OUTOF10: 6
PAINLEVEL_OUTOF10: 8
PAINLEVEL_OUTOF10: 4

## 2021-12-22 ASSESSMENT — PAIN - FUNCTIONAL ASSESSMENT
PAIN_FUNCTIONAL_ASSESSMENT: ACTIVITIES ARE NOT PREVENTED
PAIN_FUNCTIONAL_ASSESSMENT: ACTIVITIES ARE NOT PREVENTED

## 2021-12-22 ASSESSMENT — PAIN DESCRIPTION - ORIENTATION
ORIENTATION: LOWER
ORIENTATION: LOWER

## 2021-12-22 ASSESSMENT — PAIN DESCRIPTION - PAIN TYPE
TYPE: CHRONIC PAIN
TYPE: CHRONIC PAIN

## 2021-12-22 ASSESSMENT — PAIN DESCRIPTION - LOCATION
LOCATION: BACK
LOCATION: BACK

## 2021-12-22 ASSESSMENT — PAIN DESCRIPTION - PROGRESSION
CLINICAL_PROGRESSION: NOT CHANGED
CLINICAL_PROGRESSION: NOT CHANGED

## 2021-12-22 ASSESSMENT — PAIN DESCRIPTION - FREQUENCY
FREQUENCY: CONTINUOUS
FREQUENCY: CONTINUOUS

## 2021-12-22 NOTE — PROGRESS NOTES
[address]   Daily Progress Note      Admit Date:  12/18/2021      Subjective:   Mr. Felice Luciano is a 50yo male who presented to [facility] on 12/19 with chest pain. He was found to have acute inferior and anterior ST changes with a troponin of 5. He was taken to the cath lab by Dr. Cierra Bethea on 12/19/20. He had thrombus in his LM and this underwent aspiration thrombectomy followe dby PTCA with a 4mm balloon. He had an Impella placed at that time. Interval History:  Impella removed yesterday. Some bleeding from the access site yesterday that was controlled with manual pressure. No events overnight. He had a little dizziness getting up to the bathroom today.      Objective:     /64   Pulse 103   Temp 98.6 °F (37 °C) (Oral)   Resp 17   Ht 6' 1\" (1.854 m)   Wt 214 lb 8.1 oz (97.3 kg)   SpO2 94%   BMI 28.30 kg/m²      Intake/Output Summary (Last 24 hours) at 12/22/2021 1151  Last data filed at 12/22/2021 0905  Gross per 24 hour   Intake 5554.58 ml   Output 4250 ml   Net 1304.58 ml       Physical Exam:  General:  Awake, alert, NAD  Skin:  Warm and dry  Neck:  JVD<8, no carotid bruits  Chest:  Clear to auscultation, no wheezes/rhonchi/rales  Cardiovascular:  RRR, normal S1/S2, no M/R/G  Abdomen:  Soft, nontender, +bowel sounds  Extremities:  No edema  Pulses: 2+ bilat carotid    2+ bilat radial    2+ bilat femoral        Medications:    sodium chloride flush  5-40 mL IntraVENous 2 times per day    sodium chloride flush  5-40 mL IntraVENous 2 times per day    metoprolol succinate  12.5 mg Oral Daily    amiodarone  200 mg Oral BID    atorvastatin  40 mg Oral Nightly    aspirin  81 mg Oral Daily    potassium chloride  40 mEq Oral BID    ticagrelor  90 mg Oral BID      sodium chloride      sodium chloride      sodium chloride 200 mL/hr at 12/22/21 0555    norepinephrine Stopped (12/20/21 0130)    [Held by provider] heparin (PORCINE) Infusion Stopped (12/21/21 1055)       Lab Data:  CBC:   Recent Labs     12/20/21  1452 12/21/21  0555 12/22/21  0444   WBC 10.5 9.1 11.3*   HGB 9.9* 9.1* 9.2*    146 227     BMP:    Recent Labs     12/20/21  0405 12/21/21  0555 12/22/21  0444   * 135* 137   K 3.7 3.9 4.6   CO2 25 23 21   BUN 22* 14 8   CREATININE 0.7* 0.7* 0.6*     LIVR:   Recent Labs     12/20/21  0405   *   ALT 59*     PT/INR:   Recent Labs     12/20/21  0405   PROT 4.9*     No results for input(s): CKMB, CKMBINDEX, TROPONINI in the last 72 hours. Invalid input(s): CKTOTAL;3      Left Heart Catheterization 12/19/21:    HEMODYNAMICS:       Aortic pressure was 70    LVEDP 15. There was no gradient between the left ventricle and aorta.       CORONARY ARTERIOGRAM:        There was no atherosclerotic disease in the coronary circulation except a clot in the ostial left main     L Main ostial clot with TERESSA grade I flow     LAD mid distal LAD had no flow     LCX extreme distal circumflex had no flow     RCA widely patent     LV GRAM: Not performed        CONCLUSION:     Clot in the ostial left main with TERESSA grade I 2 flow into the LAD and circumflex  Dominant RCA was widely patent  LV gram was not performed     INTERVENTION     Procedures:   Temporary pacemaker placement using right femoral vein approach  Left heart catheterization coronary angiogram LV angiogram  Percutaneous coronary angioplasty and thrombectomy of the ostial left main stent  Insertion of intra-aortic balloon pump using the left femoral artery  Insertion of Impella device using right femoral artery    Assessment / Plan:     1. Acute Anterior Myocardial Infarction  Continue aspirin and Brilinta. AMI seems secondary to embolization. Start oral anticoagulation with Xarelto 20mg daily. Continue triple therapy. Start low dose lisinopril at 2.5mg in the evening. 2. Cardiogenic Shock  Improved LVEF by most recent echo. Will recheck limited echo this afternoon.     3 Hyperlipidemia with goal LDL<70mg/dL  Continue statin therapy with atorvastatin 40mg daily.          Signed:  Angeline Carrera MD

## 2021-12-23 VITALS
WEIGHT: 215.17 LBS | DIASTOLIC BLOOD PRESSURE: 66 MMHG | RESPIRATION RATE: 17 BRPM | BODY MASS INDEX: 28.52 KG/M2 | TEMPERATURE: 98.2 F | SYSTOLIC BLOOD PRESSURE: 100 MMHG | OXYGEN SATURATION: 98 % | HEART RATE: 83 BPM | HEIGHT: 73 IN

## 2021-12-23 LAB
ANION GAP SERPL CALCULATED.3IONS-SCNC: 14 MMOL/L (ref 3–16)
BASOPHILS ABSOLUTE: 0.1 K/UL (ref 0–0.2)
BASOPHILS RELATIVE PERCENT: 0.5 %
BLOOD CULTURE, ROUTINE: NORMAL
BUN BLDV-MCNC: 8 MG/DL (ref 7–20)
CALCIUM SERPL-MCNC: 9.3 MG/DL (ref 8.3–10.6)
CHLORIDE BLD-SCNC: 106 MMOL/L (ref 99–110)
CO2: 23 MMOL/L (ref 21–32)
CREAT SERPL-MCNC: 1 MG/DL (ref 0.9–1.3)
CULTURE, BLOOD 2: NORMAL
EKG ATRIAL RATE: 86 BPM
EKG DIAGNOSIS: NORMAL
EKG P AXIS: 61 DEGREES
EKG P-R INTERVAL: 180 MS
EKG Q-T INTERVAL: 394 MS
EKG QRS DURATION: 146 MS
EKG QTC CALCULATION (BAZETT): 471 MS
EKG R AXIS: 150 DEGREES
EKG T AXIS: 43 DEGREES
EKG VENTRICULAR RATE: 86 BPM
EOSINOPHILS ABSOLUTE: 0.5 K/UL (ref 0–0.6)
EOSINOPHILS RELATIVE PERCENT: 3.1 %
GFR AFRICAN AMERICAN: >60
GFR NON-AFRICAN AMERICAN: >60
GLUCOSE BLD-MCNC: 112 MG/DL (ref 70–99)
HCT VFR BLD CALC: 30.3 % (ref 40.5–52.5)
HEMOGLOBIN: 10.1 G/DL (ref 13.5–17.5)
LYMPHOCYTES ABSOLUTE: 2.6 K/UL (ref 1–5.1)
LYMPHOCYTES RELATIVE PERCENT: 17.9 %
MAGNESIUM: 1.8 MG/DL (ref 1.8–2.4)
MCH RBC QN AUTO: 32 PG (ref 26–34)
MCHC RBC AUTO-ENTMCNC: 33.3 G/DL (ref 31–36)
MCV RBC AUTO: 96 FL (ref 80–100)
MONOCYTES ABSOLUTE: 1.5 K/UL (ref 0–1.3)
MONOCYTES RELATIVE PERCENT: 10.1 %
NEUTROPHILS ABSOLUTE: 10.1 K/UL (ref 1.7–7.7)
NEUTROPHILS RELATIVE PERCENT: 68.4 %
PDW BLD-RTO: 14 % (ref 12.4–15.4)
PHOSPHORUS: 3.6 MG/DL (ref 2.5–4.9)
PLATELET # BLD: 346 K/UL (ref 135–450)
PMV BLD AUTO: 7.3 FL (ref 5–10.5)
POTASSIUM SERPL-SCNC: 5.5 MMOL/L (ref 3.5–5.1)
RBC # BLD: 3.16 M/UL (ref 4.2–5.9)
SODIUM BLD-SCNC: 143 MMOL/L (ref 136–145)
WBC # BLD: 14.8 K/UL (ref 4–11)

## 2021-12-23 PROCEDURE — 99239 HOSP IP/OBS DSCHRG MGMT >30: CPT | Performed by: INTERNAL MEDICINE

## 2021-12-23 PROCEDURE — 85025 COMPLETE CBC W/AUTO DIFF WBC: CPT

## 2021-12-23 PROCEDURE — 93005 ELECTROCARDIOGRAM TRACING: CPT | Performed by: INTERNAL MEDICINE

## 2021-12-23 PROCEDURE — 36415 COLL VENOUS BLD VENIPUNCTURE: CPT

## 2021-12-23 PROCEDURE — 83735 ASSAY OF MAGNESIUM: CPT

## 2021-12-23 PROCEDURE — 84100 ASSAY OF PHOSPHORUS: CPT

## 2021-12-23 PROCEDURE — 2580000003 HC RX 258: Performed by: INTERNAL MEDICINE

## 2021-12-23 PROCEDURE — 94761 N-INVAS EAR/PLS OXIMETRY MLT: CPT

## 2021-12-23 PROCEDURE — 80048 BASIC METABOLIC PNL TOTAL CA: CPT

## 2021-12-23 PROCEDURE — 6370000000 HC RX 637 (ALT 250 FOR IP): Performed by: INTERNAL MEDICINE

## 2021-12-23 PROCEDURE — 93010 ELECTROCARDIOGRAM REPORT: CPT | Performed by: INTERNAL MEDICINE

## 2021-12-23 RX ORDER — SPIRONOLACTONE 25 MG/1
12.5 TABLET ORAL DAILY
Qty: 15 TABLET | Refills: 3 | Status: SHIPPED | OUTPATIENT
Start: 2021-12-23 | End: 2021-12-23 | Stop reason: HOSPADM

## 2021-12-23 RX ORDER — LISINOPRIL 2.5 MG/1
2.5 TABLET ORAL EVERY EVENING
Qty: 30 TABLET | Refills: 3 | Status: SHIPPED | OUTPATIENT
Start: 2021-12-23 | End: 2022-03-14

## 2021-12-23 RX ORDER — FUROSEMIDE 20 MG/1
20 TABLET ORAL DAILY PRN
Qty: 60 TABLET | Refills: 3 | Status: SHIPPED | OUTPATIENT
Start: 2021-12-23

## 2021-12-23 RX ORDER — AMIODARONE HYDROCHLORIDE 200 MG/1
200 TABLET ORAL DAILY
Qty: 30 TABLET | Refills: 3 | Status: SHIPPED | OUTPATIENT
Start: 2021-12-23 | End: 2022-03-14

## 2021-12-23 RX ORDER — METOPROLOL SUCCINATE 25 MG/1
12.5 TABLET, EXTENDED RELEASE ORAL DAILY
Qty: 30 TABLET | Refills: 3 | Status: SHIPPED | OUTPATIENT
Start: 2021-12-24

## 2021-12-23 RX ORDER — ATORVASTATIN CALCIUM 40 MG/1
40 TABLET, FILM COATED ORAL NIGHTLY
Qty: 30 TABLET | Refills: 3 | Status: SHIPPED | OUTPATIENT
Start: 2021-12-23 | End: 2022-03-14

## 2021-12-23 RX ORDER — ASPIRIN 81 MG/1
81 TABLET, CHEWABLE ORAL DAILY
Qty: 30 TABLET | Refills: 3 | Status: SHIPPED | OUTPATIENT
Start: 2021-12-24 | End: 2022-01-24 | Stop reason: ALTCHOICE

## 2021-12-23 RX ADMIN — SODIUM CHLORIDE, PRESERVATIVE FREE 10 ML: 5 INJECTION INTRAVENOUS at 09:46

## 2021-12-23 RX ADMIN — AMIODARONE HYDROCHLORIDE 200 MG: 200 TABLET ORAL at 09:41

## 2021-12-23 RX ADMIN — METOPROLOL SUCCINATE 12.5 MG: 25 TABLET, EXTENDED RELEASE ORAL at 09:41

## 2021-12-23 RX ADMIN — TICAGRELOR 90 MG: 90 TABLET ORAL at 09:41

## 2021-12-23 RX ADMIN — ASPIRIN 81 MG: 81 TABLET, CHEWABLE ORAL at 09:41

## 2021-12-23 ASSESSMENT — PAIN SCALES - GENERAL
PAINLEVEL_OUTOF10: 0
PAINLEVEL_OUTOF10: 0

## 2021-12-23 NOTE — CARE COORDINATION
DC order rec'd. Chart reviewed. Spoke with [employer] in [employer] who reports due to no insurance, his bill is approx $111. Placed on [facility] for these medications. Call unable to speak with pt but did speak with bedside RN who reports pt's new insurance starts in January 2022 and he will be able to get his meds then. He has no needs for DC at this time.   Grady Fay, [place]     Case Management   [phone]    12/23/2021  2:39 PM

## 2021-12-23 NOTE — DISCHARGE SUMMARY
[address] [facility] Discharge Note      Patient ID: Elina Claudio 55 y.o. 1965    Admission Date: 12/18/2021     Discharge Date:  12/23/21    Reason for Admission:  Principal Diagnosis: Acute Anterior Myocardial Infarction  Secondary Diagnosis: Cardiogenic Shock    Procedures:  Left Heart Catheterization with aspiration thrombectomy and PTCA to LM coronary artery  Echocardiogram  Impella Placement  Telemetry Monitoring    Brief Summary of Patient's Course:  is a 50yo male who presented to [facility] on 12/19 with chest pain. He was found to have acute inferior and anterior ST changes with a troponin of 5. He was taken to the cath lab by Dr. Nayla Wallace on 12/19/20. He had thrombus in his LM and this underwent aspiration thrombectomy followe dby PTCA with a 4mm balloon. He had an Impella placed at that time. Bruno Joseph has continued to improve. On 12/21/21 christine went back to the catheterization lab and he underwent further thrombectomy to the LM. His Impella was removed at that time due to improvement in his cardiac status and LVEF. His LVEF is now 35%. He has been ambulating the halls with no shortness of breath, chest pain or dizziness. Sinus rhythm on telemetry. Vitals:    12/23/21 1320   BP:    Pulse: 99   Resp:    Temp:    SpO2:      RR, normal S1/S2, no M/R/G  Lungs bilaterally CTA  Abd soft, NT ND  No edema  No focal neurologic deficits  2+ bilateral radial and femoral pulses    Labs reviewed and unremarkable    We will discharge him to home on DAPT with aspirin and Brilinta. He will also be on Xarelto her in the short term for a presumed embolic event. He is on beta blockade, ACE and aldactone therapy. Follow-up in our office in 1 week. He will need 3 months of GDMT but likely will not need and AICD. Referral to phase 2 cardiac rehab as an outpatient. Total time of discharge >30 minutes. The patient was in good condition and stable at discharge.     Discharge Instructions: Medications    Activity Limitations:  No heavy lifting. Diet:  low sodium    Follow Up Instructions: To office in: in 1 weeks  Instructed Re: Medications    Discharge Medications:     Medication List      START taking these medications    amiodarone 200 MG tablet  Commonly known as: CORDARONE  Take 1 tablet by mouth daily     aspirin 81 MG chewable tablet  Take 1 tablet by mouth daily  Start taking on: December 24, 2021     atorvastatin 40 MG tablet  Commonly known as: LIPITOR  Take 1 tablet by mouth nightly     metoprolol succinate 25 MG extended release tablet  Commonly known as: TOPROL XL  Take 0.5 tablets by mouth daily  Start taking on: December 24, 2021     rivaroxaban 20 MG Tabs tablet  Commonly known as: XARELTO  Take 1 tablet by mouth daily     spironolactone 25 MG tablet  Commonly known as: Aldactone  Take 0.5 tablets by mouth daily     ticagrelor 90 MG Tabs tablet  Commonly known as: BRILINTA  Take 1 tablet by mouth 2 times daily        CHANGE how you take these medications    lisinopril 2.5 MG tablet  Commonly known as: PRINIVIL;ZESTRIL  Take 1 tablet by mouth every evening  What changed:   · medication strength  · how much to take  · when to take this        STOP taking these medications    hydroCHLOROthiazide 25 MG tablet  Commonly known as: HYDRODIURIL           Where to Get Your Medications      These medications were sent to [address] [place] [address] [address], [address] - F [phone]  [address] [place] [address], [address] [address]    Phone: [phone]   · amiodarone 200 MG tablet  · aspirin 81 MG chewable tablet  · atorvastatin 40 MG tablet  · lisinopril 2.5 MG tablet  · metoprolol succinate 25 MG extended release tablet  · rivaroxaban 20 MG Tabs tablet  · spironolactone 25 MG tablet  · ticagrelor 90 MG Tabs tablet                              Discharge Summary as above.     Attending Physician:   Jennifer Boggs MD, MD, 12/23/2021, 1:22

## 2021-12-28 NOTE — PROGRESS NOTES
[address] VISIT    This is a telephone visit and therefore no physical exam performed. Melynda Najjar  1965 December 30, 2021    CC:   Chief Complaint   Patient presents with    Follow-Up from Hospital     dizziness     HPI:  The patient is 55 y.o. male with a past medical history significant for HTN who is being seen virtually for hospital follow up. He was hospitalized at [facility] from 12/19/2021-12/23/2021 after presenting with chest pain. He was found to have a troponin assay of 5 and ECG showed acute inferior and anterior ST changes. He was taken to the cath lab (Dr. Wild Kim) and had thrombus in his LM and underwent aspiration thrombectomy followed by PTCA. An Impella was placed at that time d/t cardiogenic shock. Impella was removed on 12/21/2021. His acute anterior MI seemed to be secondary to embolization and he was placed on Xarelto in addition to ASA and Brilinta. LVEF 35% at time of discharge. He was discharged home. Since he has been diagnosed with COVID 19 and therefore is being seen in virtual visit today. Completed via telephone. Quit smoking 14 years ago. He took home test earlier this week and found to be COVID +. Last night laying on his L side and took a deep breath and felt sharp L chest pain that was here and gone and associated with flushing. Episode of epistaxis (mild) yesterday. He feels restless and has felt some dizziness when he first gets up and passes quickly. He associates it with Brilinta. He walked thru grocery store the other night and felt fatigued after. He is trying to increase his activity. Denies SOB/orthopnea/PND, cough, palpitations or syncope. Denies edema, weight change or claudication. He has been taking lasix every day and he feels his skin is dry and feels dehydrated. Review of System:    · Constitutional: No fevers, chills. · Eyes: No visual changes or diplopia. No scleral icterus. · ENT: No Headaches.  No mouth sores or sore throat. · Cardiovascular: Yes for chest pain, No for dyspnea on exertion, No for palpitations or No for loss of consciousness. No cough, hemoptysis, Yes for pleuritic pain, or phlebitis. · Respiratory: No for cough or wheezing. No hematemesis. · Gastrointestinal: No abdominal pain, blood in stools. · Genitourinary: No dysuria, or hematuria. · Musculoskeletal: No gait disturbance,    · Integumentary: No rash or pruritis. · Neurological: No headache, change in muscle strength, numbness or tingling. · Psychiatric: No anxiety, or depression. · Endocrine: No temperature intolerance. No excessive thirst, fluid intake, or urination. · Hem/Lymph: No abnormal bruising or bleeding, blood clots or swollen lymph nodes. · Allergic/Immunologic: No nasal congestion or hives. Past Medical History:   Diagnosis Date    CAD (coronary artery disease)     Hyperlipidemia     Hypertension     MI (myocardial infarction) ([facility] [address] 12/2021    Anterior MI     Past Surgical History:   Procedure Laterality Date    PTCA       History reviewed. No pertinent family history.   Social History     Tobacco Use    Smoking status: Former Smoker    Smokeless tobacco: Former User    Tobacco comment: not for 14 years    Vaping Use    Vaping Use: Never used   Substance Use Topics    Alcohol use: Not Currently    Drug use: Not on file       No Known Allergies  Current Outpatient Medications   Medication Sig Dispense Refill    ticagrelor (BRILINTA) 90 MG TABS tablet Take 1 tablet by mouth 2 times daily 60 tablet 3    aspirin 81 MG chewable tablet Take 1 tablet by mouth daily 30 tablet 3    amiodarone (CORDARONE) 200 MG tablet Take 1 tablet by mouth daily 30 tablet 3    rivaroxaban (XARELTO) 20 MG TABS tablet Take 1 tablet by mouth daily 30 tablet 3    atorvastatin (LIPITOR) 40 MG tablet Take 1 tablet by mouth nightly 30 tablet 3    lisinopril (PRINIVIL;ZESTRIL) 2.5 MG tablet Take 1 tablet by mouth every evening 30 tablet 3    metoprolol succinate (TOPROL XL) 25 MG extended release tablet Take 0.5 tablets by mouth daily 30 tablet 3    furosemide (LASIX) 20 MG tablet Take 1 tablet by mouth daily as needed (for weight increase, lower extremity swelling or shortness of breath.) 60 tablet 3     No current facility-administered medications for this visit. Lab Review:   No results found for: TRIG, HDL, LDLCALC, LDLDIRECT, LABVLDL  Lab Results   Component Value Date     12/23/2021    K 5.5 12/23/2021    K 4.2 12/19/2021     12/23/2021    CO2 23 12/23/2021    BUN 8 12/23/2021    CREATININE 1.0 12/23/2021    GLUCOSE 112 12/23/2021    CALCIUM 9.3 12/23/2021      Lab Results   Component Value Date    WBC 14.8 (H) 12/23/2021    HGB 10.1 (L) 12/23/2021    HCT 30.3 (L) 12/23/2021    MCV 96.0 12/23/2021     12/23/2021     Diagnostics/Procedures:  Left Heart Catheterization 12/19/21:     HEMODYNAMICS:    Aortic pressure was 70    LVEDP 15.    There was no gradient between the left ventricle and aorta.       CORONARY ARTERIOGRAM:     There was no atherosclerotic disease in the coronary circulation except a clot in the ostial left main  L Main ostial clot with TERESSA grade I flow  LAD mid distal LAD had no flow  LCX extreme distal circumflex had no flow  RCA widely patent  LV GRAM: Not performed      CONCLUSION:  Clot in the ostial left main with TERESSA grade I 2 flow into the LAD and circumflex  Dominant RCA was widely patent  LV gram was not performed     INTERVENTION  Temporary pacemaker placement using right femoral vein approach  Left heart catheterization coronary angiogram LV angiogram  Percutaneous coronary angioplasty and thrombectomy of the ostial left main stent  Insertion of intra-aortic balloon pump using the left femoral artery  Insertion of Impella device using right femoral artery    Echo 12/19/2021 (Limited):   Limited study. Overall left ventricular systolic function appears severely reduced.    Ejection fraction is visually estimated to be < 20%. Regional wall motion   abnormalities are noted and described below. Normal left ventricular wall   thickness and cavity size. Impella noted in LV cavity    Echo (limited) 12/20/2021:   Ejection fraction is visually estimated to be 35-40%. mid to apical akinesis   Impella device is 4.5 cm into the left ventricle. Device is in an   appropriate position. Compared to prior study dated 12/19/21, the EF has   improved. Echo 12/22/2021 (Limited):  Left ventricular cavity size is normal.  There is mildly increased left ventricular wall thickness. Ejection fraction is visually estimated to be 35%. Regional wall motion abnormalities are noted. Pericardial Effusion  No pericardial effusion noted. Pleural Effusion  No pleural effusion. Assessment/Plan:    1. Acute MI, anterior wall (HCC)/CAD, multiple vessel/Ischemic cardiomyopathy  -complicated by cardiogenic shock during hospital stay  -secondary to embolization (on Xarelto)  -PTCA and thrombectomy of the ostial left main stent 12/19/2021  -continue triple therapy  -angina quiet  -continue ASA, Brilinta, Xarelto, statin, BB and ACEI  -refer to cardiac rehab  -LVEF 35-40%    2. Hyperlipidemia LDL goal <70  -continue statin    3. Essential hypertension  -fairly well controlled per pt's readings  -goal BP < 130/80  -continue medical management    4. Wide complex tachycardia  -during hospital stay and placed on amiodarone  -will continue amiodarone and follow up ECG at office visit in 2 weeks    5.  Dizziness  -suspect secondary to diuretics  -will change to lasix as needed    6. + COVID test   -symptomatically improved    Decrease Furosemide to 20 mg as needed for symptoms  Continue lisinopril, amiodarone, ASA, Brilinta, statin, Toprol, Xarelto  Refer to cardiac rehab  Check BMP, BNP and CBC  Emphasized and discussed low-fat/low sodium diet, monitoring of daily weights, fluid restriction, worsening signs and symptoms of heart failure and when to call, and the importance of regular exercise and activity. Follow up in echo in 3 months  Follow up with D. Enzweiler, CNP in 2 weeks and 4 weeks with Dr. Evert Weathers    Return in about 2 weeks (around 1/13/2022) for with D. Enzweiler, CNP and 4 weeks with Dr. Evert Weathers. Thanks for allowing me to participate in the care of this patient. SULEIMAN Juares  [facility] [address] [place] [address]  Office: [phone]  Fax: [phone]        Greg Bettencourt is a 55 y.o. male evaluated via telephone on 12/30/2021. Consent:  He and/or health care decision maker is aware that that he may receive a bill for this telephone service, depending on his insurance coverage, and has provided verbal consent to proceed: Yes      I affirm this is a Patient Initiated Episode with an Established Patient who has not had a related appointment within my department in the past 7 days or scheduled within the next 24 hours.     Total Time: minutes: 21-30 minutes    Note: not billable if this call serves to triage the patient into an appointment for the relevant concern      Jacqulynn Buerger, APRN - CNP

## 2021-12-30 ENCOUNTER — VIRTUAL VISIT (OUTPATIENT)
Dept: CARDIOLOGY CLINIC | Age: 56
End: 2021-12-30

## 2021-12-30 ENCOUNTER — TELEPHONE (OUTPATIENT)
Dept: CARDIOLOGY CLINIC | Age: 56
End: 2021-12-30

## 2021-12-30 DIAGNOSIS — I21.09 ACUTE MI, ANTERIOR WALL (HCC): Primary | ICD-10-CM

## 2021-12-30 DIAGNOSIS — I10 ESSENTIAL HYPERTENSION: ICD-10-CM

## 2021-12-30 DIAGNOSIS — I25.5 ISCHEMIC CARDIOMYOPATHY: ICD-10-CM

## 2021-12-30 DIAGNOSIS — I25.10 CAD, MULTIPLE VESSEL: ICD-10-CM

## 2021-12-30 DIAGNOSIS — E78.5 HYPERLIPIDEMIA LDL GOAL <70: ICD-10-CM

## 2021-12-30 PROCEDURE — 99443 PR PHYS/QHP TELEPHONE EVALUATION 21-30 MIN: CPT | Performed by: NURSE PRACTITIONER

## 2021-12-30 NOTE — LETTER
[address] [facility]  Phone: [phone]  Fax: [phone]    JAVIER Soto CNP    December 30, 2021     Akil Graham  [address] [address]    Patient: Dariana Crowley   MR Number: [id number]   YOB: 1965   Date of Visit: 12/30/2021       Dear Akil Graham: Thank you for referring Zuhair Portillo to me for evaluation/treatment. Below are the relevant portions of my assessment and plan of care. If you have questions, please do not hesitate to call me. I look forward to following Marga Atkins along with you.     Sincerely,      JAVIER Soto CNP

## 2022-01-24 ENCOUNTER — HOSPITAL ENCOUNTER (OUTPATIENT)
Dept: CARDIAC REHAB | Age: 57
Setting detail: THERAPIES SERIES
Discharge: HOME | End: 2022-01-24
Payer: COMMERCIAL

## 2022-01-24 PROCEDURE — 93798 PHYS/QHP OP CAR RHAB W/ECG: CPT

## 2022-01-26 ENCOUNTER — HOSPITAL ENCOUNTER (OUTPATIENT)
Dept: CARDIAC REHAB | Age: 57
Setting detail: THERAPIES SERIES
Discharge: HOME | End: 2022-01-26
Payer: COMMERCIAL

## 2022-01-26 PROCEDURE — 93798 PHYS/QHP OP CAR RHAB W/ECG: CPT

## 2022-01-28 ENCOUNTER — HOSPITAL ENCOUNTER (OUTPATIENT)
Dept: CARDIAC REHAB | Age: 57
Setting detail: THERAPIES SERIES
Discharge: HOME | End: 2022-01-28
Payer: COMMERCIAL

## 2022-01-28 PROCEDURE — 93798 PHYS/QHP OP CAR RHAB W/ECG: CPT

## 2022-01-31 ENCOUNTER — HOSPITAL ENCOUNTER (OUTPATIENT)
Dept: CARDIAC REHAB | Age: 57
Setting detail: THERAPIES SERIES
Discharge: HOME | End: 2022-01-31
Payer: COMMERCIAL

## 2022-01-31 PROCEDURE — 93798 PHYS/QHP OP CAR RHAB W/ECG: CPT

## 2022-02-02 ENCOUNTER — HOSPITAL ENCOUNTER (OUTPATIENT)
Dept: CARDIAC REHAB | Age: 57
Setting detail: THERAPIES SERIES
Discharge: HOME | End: 2022-02-02
Payer: COMMERCIAL

## 2022-02-02 PROCEDURE — 93798 PHYS/QHP OP CAR RHAB W/ECG: CPT

## 2022-02-04 ENCOUNTER — APPOINTMENT (OUTPATIENT)
Dept: CARDIAC REHAB | Age: 57
End: 2022-02-04
Payer: COMMERCIAL

## 2022-02-07 ENCOUNTER — APPOINTMENT (OUTPATIENT)
Dept: CARDIAC REHAB | Age: 57
End: 2022-02-07
Payer: COMMERCIAL

## 2022-02-09 ENCOUNTER — APPOINTMENT (OUTPATIENT)
Dept: CARDIAC REHAB | Age: 57
End: 2022-02-09
Payer: COMMERCIAL

## 2022-02-11 ENCOUNTER — APPOINTMENT (OUTPATIENT)
Dept: CARDIAC REHAB | Age: 57
End: 2022-02-11
Payer: COMMERCIAL

## 2022-02-14 ENCOUNTER — HOSPITAL ENCOUNTER (OUTPATIENT)
Dept: CARDIAC REHAB | Age: 57
Setting detail: THERAPIES SERIES
Discharge: HOME | End: 2022-02-14
Payer: COMMERCIAL

## 2022-02-14 PROCEDURE — 93798 PHYS/QHP OP CAR RHAB W/ECG: CPT

## 2022-02-16 ENCOUNTER — HOSPITAL ENCOUNTER (OUTPATIENT)
Dept: CARDIAC REHAB | Age: 57
Setting detail: THERAPIES SERIES
Discharge: HOME | End: 2022-02-16
Payer: COMMERCIAL

## 2022-02-16 PROCEDURE — 93798 PHYS/QHP OP CAR RHAB W/ECG: CPT

## 2022-02-18 ENCOUNTER — HOSPITAL ENCOUNTER (OUTPATIENT)
Dept: CARDIAC REHAB | Age: 57
Setting detail: THERAPIES SERIES
Discharge: HOME | End: 2022-02-18
Payer: COMMERCIAL

## 2022-02-18 PROCEDURE — 93798 PHYS/QHP OP CAR RHAB W/ECG: CPT

## 2022-02-21 ENCOUNTER — HOSPITAL ENCOUNTER (OUTPATIENT)
Dept: CARDIAC REHAB | Age: 57
Setting detail: THERAPIES SERIES
Discharge: HOME | End: 2022-02-21
Payer: COMMERCIAL

## 2022-02-21 PROCEDURE — 93798 PHYS/QHP OP CAR RHAB W/ECG: CPT

## 2022-02-23 ENCOUNTER — HOSPITAL ENCOUNTER (OUTPATIENT)
Dept: CARDIAC REHAB | Age: 57
Setting detail: THERAPIES SERIES
Discharge: HOME | End: 2022-02-23
Payer: COMMERCIAL

## 2022-02-23 PROCEDURE — 93798 PHYS/QHP OP CAR RHAB W/ECG: CPT

## 2022-02-25 ENCOUNTER — HOSPITAL ENCOUNTER (OUTPATIENT)
Dept: CARDIAC REHAB | Age: 57
Setting detail: THERAPIES SERIES
Discharge: HOME | End: 2022-02-25
Payer: COMMERCIAL

## 2022-02-25 PROCEDURE — 93798 PHYS/QHP OP CAR RHAB W/ECG: CPT

## 2022-02-28 ENCOUNTER — HOSPITAL ENCOUNTER (OUTPATIENT)
Dept: CARDIAC REHAB | Age: 57
Setting detail: THERAPIES SERIES
Discharge: HOME | End: 2022-02-28
Payer: COMMERCIAL

## 2022-02-28 PROCEDURE — 93798 PHYS/QHP OP CAR RHAB W/ECG: CPT

## 2022-03-02 ENCOUNTER — HOSPITAL ENCOUNTER (OUTPATIENT)
Dept: CARDIAC REHAB | Age: 57
Setting detail: THERAPIES SERIES
Discharge: HOME | End: 2022-03-02
Payer: COMMERCIAL

## 2022-03-02 PROCEDURE — 93798 PHYS/QHP OP CAR RHAB W/ECG: CPT

## 2022-03-04 ENCOUNTER — HOSPITAL ENCOUNTER (OUTPATIENT)
Dept: CARDIAC REHAB | Age: 57
Setting detail: THERAPIES SERIES
Discharge: HOME | End: 2022-03-04
Payer: COMMERCIAL

## 2022-03-04 PROCEDURE — 93798 PHYS/QHP OP CAR RHAB W/ECG: CPT

## 2022-03-07 ENCOUNTER — HOSPITAL ENCOUNTER (OUTPATIENT)
Dept: CARDIAC REHAB | Age: 57
Setting detail: THERAPIES SERIES
Discharge: HOME | End: 2022-03-07
Payer: COMMERCIAL

## 2022-03-07 PROCEDURE — 93798 PHYS/QHP OP CAR RHAB W/ECG: CPT

## 2022-03-09 ENCOUNTER — HOSPITAL ENCOUNTER (OUTPATIENT)
Dept: CARDIAC REHAB | Age: 57
Setting detail: THERAPIES SERIES
Discharge: HOME | End: 2022-03-09
Payer: COMMERCIAL

## 2022-03-09 PROCEDURE — 93798 PHYS/QHP OP CAR RHAB W/ECG: CPT

## 2022-03-11 ENCOUNTER — HOSPITAL ENCOUNTER (OUTPATIENT)
Dept: CARDIAC REHAB | Age: 57
Setting detail: THERAPIES SERIES
Discharge: HOME | End: 2022-03-11
Payer: COMMERCIAL

## 2022-03-11 PROCEDURE — 93798 PHYS/QHP OP CAR RHAB W/ECG: CPT

## 2022-03-14 ENCOUNTER — HOSPITAL ENCOUNTER (OUTPATIENT)
Dept: CARDIAC REHAB | Age: 57
Setting detail: THERAPIES SERIES
Discharge: HOME | End: 2022-03-14
Payer: COMMERCIAL

## 2022-03-14 PROCEDURE — 93798 PHYS/QHP OP CAR RHAB W/ECG: CPT

## 2022-03-14 RX ORDER — TICAGRELOR 90 MG/1
TABLET ORAL
Qty: 180 TABLET | Refills: 1 | Status: SHIPPED | OUTPATIENT
Start: 2022-03-14

## 2022-03-14 RX ORDER — AMIODARONE HYDROCHLORIDE 200 MG/1
TABLET ORAL
Qty: 90 TABLET | Refills: 1 | Status: SHIPPED | OUTPATIENT
Start: 2022-03-14

## 2022-03-14 RX ORDER — ATORVASTATIN CALCIUM 40 MG/1
TABLET, FILM COATED ORAL
Qty: 90 TABLET | Refills: 1 | Status: SHIPPED | OUTPATIENT
Start: 2022-03-14

## 2022-03-14 RX ORDER — LISINOPRIL 2.5 MG/1
2.5 TABLET ORAL EVERY EVENING
Qty: 90 TABLET | Refills: 1 | Status: SHIPPED | OUTPATIENT
Start: 2022-03-14

## 2022-03-16 ENCOUNTER — HOSPITAL ENCOUNTER (OUTPATIENT)
Dept: CARDIAC REHAB | Age: 57
Setting detail: THERAPIES SERIES
Discharge: HOME | End: 2022-03-16
Payer: COMMERCIAL

## 2022-03-16 PROCEDURE — 93798 PHYS/QHP OP CAR RHAB W/ECG: CPT

## 2022-03-18 ENCOUNTER — HOSPITAL ENCOUNTER (OUTPATIENT)
Dept: CARDIAC REHAB | Age: 57
Setting detail: THERAPIES SERIES
Discharge: HOME | End: 2022-03-18
Payer: COMMERCIAL

## 2022-03-18 PROCEDURE — 93798 PHYS/QHP OP CAR RHAB W/ECG: CPT

## 2022-03-21 ENCOUNTER — HOSPITAL ENCOUNTER (OUTPATIENT)
Dept: CARDIAC REHAB | Age: 57
Setting detail: THERAPIES SERIES
Discharge: HOME | End: 2022-03-21
Payer: COMMERCIAL

## 2022-03-21 PROCEDURE — 93798 PHYS/QHP OP CAR RHAB W/ECG: CPT

## 2022-03-23 ENCOUNTER — HOSPITAL ENCOUNTER (OUTPATIENT)
Dept: CARDIAC REHAB | Age: 57
Setting detail: THERAPIES SERIES
Discharge: HOME | End: 2022-03-23
Payer: COMMERCIAL

## 2022-03-23 PROCEDURE — 93798 PHYS/QHP OP CAR RHAB W/ECG: CPT

## 2022-03-25 ENCOUNTER — HOSPITAL ENCOUNTER (OUTPATIENT)
Dept: CARDIAC REHAB | Age: 57
Setting detail: THERAPIES SERIES
Discharge: HOME | End: 2022-03-25
Payer: COMMERCIAL

## 2022-03-25 PROCEDURE — 93798 PHYS/QHP OP CAR RHAB W/ECG: CPT

## 2022-03-28 ENCOUNTER — APPOINTMENT (OUTPATIENT)
Dept: CARDIAC REHAB | Age: 57
End: 2022-03-28
Payer: COMMERCIAL

## 2022-03-30 ENCOUNTER — APPOINTMENT (OUTPATIENT)
Dept: CARDIAC REHAB | Age: 57
End: 2022-03-30
Payer: COMMERCIAL

## 2022-04-01 ENCOUNTER — HOSPITAL ENCOUNTER (OUTPATIENT)
Dept: CARDIAC REHAB | Age: 57
Setting detail: THERAPIES SERIES
Discharge: HOME | End: 2022-04-01
Payer: COMMERCIAL

## 2022-04-01 PROCEDURE — 93798 PHYS/QHP OP CAR RHAB W/ECG: CPT

## 2022-04-04 ENCOUNTER — HOSPITAL ENCOUNTER (OUTPATIENT)
Dept: CARDIAC REHAB | Age: 57
Setting detail: THERAPIES SERIES
Discharge: HOME | End: 2022-04-04
Payer: COMMERCIAL

## 2022-04-04 PROCEDURE — 93798 PHYS/QHP OP CAR RHAB W/ECG: CPT

## 2022-04-06 ENCOUNTER — HOSPITAL ENCOUNTER (OUTPATIENT)
Dept: CARDIAC REHAB | Age: 57
Setting detail: THERAPIES SERIES
Discharge: HOME | End: 2022-04-06
Payer: COMMERCIAL

## 2022-04-06 PROCEDURE — 93798 PHYS/QHP OP CAR RHAB W/ECG: CPT

## 2022-04-08 ENCOUNTER — HOSPITAL ENCOUNTER (OUTPATIENT)
Dept: CARDIAC REHAB | Age: 57
Setting detail: THERAPIES SERIES
Discharge: HOME | End: 2022-04-08
Payer: COMMERCIAL

## 2022-04-08 PROCEDURE — 93798 PHYS/QHP OP CAR RHAB W/ECG: CPT

## 2022-04-11 ENCOUNTER — HOSPITAL ENCOUNTER (OUTPATIENT)
Dept: CARDIAC REHAB | Age: 57
Setting detail: THERAPIES SERIES
Discharge: HOME | End: 2022-04-11
Payer: COMMERCIAL

## 2022-04-11 PROCEDURE — 93798 PHYS/QHP OP CAR RHAB W/ECG: CPT

## 2022-04-13 ENCOUNTER — HOSPITAL ENCOUNTER (OUTPATIENT)
Dept: CARDIAC REHAB | Age: 57
Setting detail: THERAPIES SERIES
Discharge: HOME | End: 2022-04-13
Payer: COMMERCIAL

## 2022-04-13 PROCEDURE — 93798 PHYS/QHP OP CAR RHAB W/ECG: CPT

## 2022-04-15 ENCOUNTER — APPOINTMENT (OUTPATIENT)
Dept: CARDIAC REHAB | Age: 57
End: 2022-04-15
Payer: COMMERCIAL

## 2022-04-18 ENCOUNTER — HOSPITAL ENCOUNTER (OUTPATIENT)
Dept: CARDIAC REHAB | Age: 57
Setting detail: THERAPIES SERIES
Discharge: HOME | End: 2022-04-18
Payer: COMMERCIAL

## 2022-04-18 PROCEDURE — 93798 PHYS/QHP OP CAR RHAB W/ECG: CPT

## 2022-04-20 ENCOUNTER — HOSPITAL ENCOUNTER (OUTPATIENT)
Dept: CARDIAC REHAB | Age: 57
Setting detail: THERAPIES SERIES
Discharge: HOME | End: 2022-04-20
Payer: COMMERCIAL

## 2022-04-20 PROCEDURE — 93798 PHYS/QHP OP CAR RHAB W/ECG: CPT

## 2022-04-22 ENCOUNTER — HOSPITAL ENCOUNTER (OUTPATIENT)
Dept: CARDIAC REHAB | Age: 57
Setting detail: THERAPIES SERIES
Discharge: HOME | End: 2022-04-22
Payer: COMMERCIAL

## 2022-04-22 PROCEDURE — 93798 PHYS/QHP OP CAR RHAB W/ECG: CPT

## 2022-04-25 ENCOUNTER — HOSPITAL ENCOUNTER (OUTPATIENT)
Dept: CARDIAC REHAB | Age: 57
Setting detail: THERAPIES SERIES
Discharge: HOME | End: 2022-04-25
Payer: COMMERCIAL

## 2022-04-25 PROCEDURE — 93798 PHYS/QHP OP CAR RHAB W/ECG: CPT

## 2022-04-27 ENCOUNTER — HOSPITAL ENCOUNTER (OUTPATIENT)
Dept: CARDIAC REHAB | Age: 57
Setting detail: THERAPIES SERIES
Discharge: HOME | End: 2022-04-27
Payer: COMMERCIAL

## 2022-04-27 PROCEDURE — 93798 PHYS/QHP OP CAR RHAB W/ECG: CPT

## 2022-04-29 ENCOUNTER — HOSPITAL ENCOUNTER (OUTPATIENT)
Dept: CARDIAC REHAB | Age: 57
Setting detail: THERAPIES SERIES
Discharge: HOME | End: 2022-04-29
Payer: COMMERCIAL

## 2022-04-29 PROCEDURE — 93798 PHYS/QHP OP CAR RHAB W/ECG: CPT

## 2022-05-02 ENCOUNTER — HOSPITAL ENCOUNTER (OUTPATIENT)
Dept: CARDIAC REHAB | Age: 57
Setting detail: THERAPIES SERIES
Discharge: HOME | End: 2022-05-02
Payer: COMMERCIAL

## 2022-05-02 PROCEDURE — 93798 PHYS/QHP OP CAR RHAB W/ECG: CPT

## 2022-05-04 ENCOUNTER — APPOINTMENT (OUTPATIENT)
Dept: CARDIAC REHAB | Age: 57
End: 2022-05-04
Payer: COMMERCIAL

## 2022-10-14 NOTE — TELEPHONE ENCOUNTER
Last OV:12/30/2021  Last Labs:12/30/2021  Last Refills:7/6/2022  Next Appt:None   Last EKG:  None     Patient is seeing another Cardiologist at [facility] /Office Visit 4/5/2022 LVM , Requestion patient to call the offices to confirm he will have to schedule appointment W/ Dr. Yamini Dye for refills . Please be advises Yes - the patient is able to be screened

## 2022-10-21 RX ORDER — AMIODARONE HYDROCHLORIDE 200 MG/1
TABLET ORAL
Qty: 90 TABLET | Refills: 1 | OUTPATIENT
Start: 2022-10-21

## 2022-10-21 NOTE — TELEPHONE ENCOUNTER
Called pt in regards to seeing if he was following up with us and he states he is following with [facility].

## 2022-10-26 NOTE — TELEPHONE ENCOUNTER
Left message on pt's v/m to call office to confirm if he is following cardiology at [facility] or here at [facility]. If he is coming here to [facility] he will need to schedule a follow up with Dr. Jennifer Marcial.

## 2022-11-03 NOTE — TELEPHONE ENCOUNTER
Patient is seeing another Cardiologist at [facility] /Office Visit 4/5/2022 LVM , Requestion patient to call the offices to confirm he will have to schedule appointment W/ Dr. Emile Ortiz for refills letter will be sent out if patient doesn't return phone call 11/3/2022 today. Please be advises.

## 2022-11-04 RX ORDER — ATORVASTATIN CALCIUM 40 MG/1
TABLET, FILM COATED ORAL
Qty: 30 TABLET | Refills: 1 | OUTPATIENT
Start: 2022-11-04

## 2023-01-04 RX ORDER — RIVAROXABAN 20 MG/1
TABLET, FILM COATED ORAL
Qty: 90 TABLET | Refills: 4 | OUTPATIENT
Start: 2023-01-04

## 2024-06-10 ENCOUNTER — APPOINTMENT (OUTPATIENT)
Dept: GENERAL RADIOLOGY | Age: 59
End: 2024-06-10
Payer: COMMERCIAL

## 2024-06-10 ENCOUNTER — HOSPITAL ENCOUNTER (OUTPATIENT)
Age: 59
Setting detail: OBSERVATION
Discharge: HOME | End: 2024-06-10
Attending: EMERGENCY MEDICINE | Admitting: STUDENT IN AN ORGANIZED HEALTH CARE EDUCATION/TRAINING PROGRAM
Payer: COMMERCIAL

## 2024-06-10 ENCOUNTER — APPOINTMENT (OUTPATIENT)
Dept: CT IMAGING | Age: 59
End: 2024-06-10
Payer: COMMERCIAL

## 2024-06-10 VITALS
HEART RATE: 85 BPM | TEMPERATURE: 98 F | HEIGHT: 72 IN | WEIGHT: 186 LBS | OXYGEN SATURATION: 99 % | DIASTOLIC BLOOD PRESSURE: 81 MMHG | BODY MASS INDEX: 25.19 KG/M2 | RESPIRATION RATE: 22 BRPM | SYSTOLIC BLOOD PRESSURE: 131 MMHG

## 2024-06-10 DIAGNOSIS — I21.4 NSTEMI (NON-ST ELEVATED MYOCARDIAL INFARCTION) (HCC): ICD-10-CM

## 2024-06-10 DIAGNOSIS — E83.42 HYPOMAGNESEMIA: ICD-10-CM

## 2024-06-10 DIAGNOSIS — R11.2 INTRACTABLE NAUSEA AND VOMITING: ICD-10-CM

## 2024-06-10 DIAGNOSIS — R07.9 CHEST PAIN, UNSPECIFIED TYPE: Primary | ICD-10-CM

## 2024-06-10 DIAGNOSIS — E87.6 HYPOKALEMIA: ICD-10-CM

## 2024-06-10 PROBLEM — I20.0 UNSTABLE ANGINA (HCC): Status: ACTIVE | Noted: 2024-06-10

## 2024-06-10 LAB
ALBUMIN SERPL-MCNC: 4.1 G/DL (ref 3.4–5)
ALBUMIN SERPL-MCNC: 4.1 G/DL (ref 3.4–5)
ALBUMIN SERPL-MCNC: 4.2 G/DL (ref 3.4–5)
ALBUMIN/GLOB SERPL: 1.5 {RATIO} (ref 1.1–2.2)
ALBUMIN/GLOB SERPL: 1.6 {RATIO} (ref 1.1–2.2)
ALBUMIN/GLOB SERPL: 1.6 {RATIO} (ref 1.1–2.2)
ALP SERPL-CCNC: 66 U/L (ref 40–129)
ALP SERPL-CCNC: 68 U/L (ref 40–129)
ALP SERPL-CCNC: 68 U/L (ref 40–129)
ALT SERPL-CCNC: 21 U/L (ref 10–40)
ALT SERPL-CCNC: 22 U/L (ref 10–40)
ALT SERPL-CCNC: 23 U/L (ref 10–40)
ANION GAP SERPL CALCULATED.3IONS-SCNC: 10 MMOL/L (ref 3–16)
ANION GAP SERPL CALCULATED.3IONS-SCNC: 12 MMOL/L (ref 3–16)
ANION GAP SERPL CALCULATED.3IONS-SCNC: 21 MMOL/L (ref 3–16)
APTT BLD: 24.4 SEC (ref 22.1–36.4)
APTT BLD: 30.8 SEC (ref 22.1–36.4)
AST SERPL-CCNC: 19 U/L (ref 15–37)
AST SERPL-CCNC: 19 U/L (ref 15–37)
AST SERPL-CCNC: 22 U/L (ref 15–37)
BASOPHILS # BLD: 0 K/UL (ref 0–0.2)
BASOPHILS # BLD: 0.1 K/UL (ref 0–0.2)
BASOPHILS NFR BLD: 0.1 %
BASOPHILS NFR BLD: 0.6 %
BILIRUB SERPL-MCNC: 0.8 MG/DL (ref 0–1)
BILIRUB SERPL-MCNC: 0.8 MG/DL (ref 0–1)
BILIRUB SERPL-MCNC: 1 MG/DL (ref 0–1)
BUN SERPL-MCNC: 16 MG/DL (ref 7–20)
BUN SERPL-MCNC: 16 MG/DL (ref 7–20)
BUN SERPL-MCNC: 17 MG/DL (ref 7–20)
CALCIUM SERPL-MCNC: 9.2 MG/DL (ref 8.3–10.6)
CALCIUM SERPL-MCNC: 9.3 MG/DL (ref 8.3–10.6)
CALCIUM SERPL-MCNC: 9.5 MG/DL (ref 8.3–10.6)
CHLORIDE SERPL-SCNC: 105 MMOL/L (ref 99–110)
CHLORIDE SERPL-SCNC: 105 MMOL/L (ref 99–110)
CHLORIDE SERPL-SCNC: 99 MMOL/L (ref 99–110)
CO2 SERPL-SCNC: 19 MMOL/L (ref 21–32)
CO2 SERPL-SCNC: 22 MMOL/L (ref 21–32)
CO2 SERPL-SCNC: 24 MMOL/L (ref 21–32)
CREAT SERPL-MCNC: 0.8 MG/DL (ref 0.9–1.3)
CREAT SERPL-MCNC: 0.8 MG/DL (ref 0.9–1.3)
CREAT SERPL-MCNC: 1 MG/DL (ref 0.9–1.3)
D-DIMER QUANTITATIVE: 0.38 UG/ML FEU (ref 0–0.6)
DEPRECATED RDW RBC AUTO: 14.5 % (ref 12.4–15.4)
DEPRECATED RDW RBC AUTO: 15 % (ref 12.4–15.4)
ECHO BSA: 2.07 M2
EKG ATRIAL RATE: 85 BPM
EKG DIAGNOSIS: NORMAL
EKG P AXIS: 70 DEGREES
EKG P-R INTERVAL: 182 MS
EKG Q-T INTERVAL: 446 MS
EKG QRS DURATION: 150 MS
EKG QTC CALCULATION (BAZETT): 530 MS
EKG R AXIS: 217 DEGREES
EKG T AXIS: 58 DEGREES
EKG VENTRICULAR RATE: 85 BPM
EOSINOPHIL # BLD: 0 K/UL (ref 0–0.6)
EOSINOPHIL # BLD: 0.1 K/UL (ref 0–0.6)
EOSINOPHIL NFR BLD: 0 %
EOSINOPHIL NFR BLD: 0.5 %
GFR SERPLBLD CREATININE-BSD FMLA CKD-EPI: 88 ML/MIN/{1.73_M2}
GFR SERPLBLD CREATININE-BSD FMLA CKD-EPI: >90 ML/MIN/{1.73_M2}
GFR SERPLBLD CREATININE-BSD FMLA CKD-EPI: >90 ML/MIN/{1.73_M2}
GLUCOSE SERPL-MCNC: 112 MG/DL (ref 70–99)
GLUCOSE SERPL-MCNC: 133 MG/DL (ref 70–99)
GLUCOSE SERPL-MCNC: 135 MG/DL (ref 70–99)
HCT VFR BLD AUTO: 43.4 % (ref 40.5–52.5)
HCT VFR BLD AUTO: 44.3 % (ref 40.5–52.5)
HGB BLD-MCNC: 14.3 G/DL (ref 13.5–17.5)
HGB BLD-MCNC: 15.1 G/DL (ref 13.5–17.5)
LYMPHOCYTES # BLD: 0.9 K/UL (ref 1–5.1)
LYMPHOCYTES # BLD: 2 K/UL (ref 1–5.1)
LYMPHOCYTES NFR BLD: 13.3 %
LYMPHOCYTES NFR BLD: 6.4 %
MAGNESIUM SERPL-MCNC: 1.5 MG/DL (ref 1.8–2.4)
MCH RBC QN AUTO: 32.1 PG (ref 26–34)
MCHC RBC AUTO-ENTMCNC: 33 G/DL (ref 31–36)
MCHC RBC AUTO-ENTMCNC: 34 G/DL (ref 31–36)
MCV RBC AUTO: 94.5 FL (ref 80–100)
MCV RBC AUTO: 95.9 FL (ref 80–100)
MONOCYTES # BLD: 0.2 K/UL (ref 0–1.3)
MONOCYTES # BLD: 1 K/UL (ref 0–1.3)
MONOCYTES NFR BLD: 1.6 %
MONOCYTES NFR BLD: 6.8 %
NEUTROPHILS # BLD: 11.7 K/UL (ref 1.7–7.7)
NEUTROPHILS # BLD: 12.9 K/UL (ref 1.7–7.7)
NEUTROPHILS NFR BLD: 78.8 %
NEUTROPHILS NFR BLD: 91.9 %
NT-PROBNP SERPL-MCNC: 362 PG/ML (ref 0–124)
PLATELET # BLD AUTO: 300 K/UL (ref 135–450)
PLATELET # BLD AUTO: 343 K/UL (ref 135–450)
PMV BLD AUTO: 8.4 FL (ref 5–10.5)
PMV BLD AUTO: 8.5 FL (ref 5–10.5)
POTASSIUM SERPL-SCNC: 3 MMOL/L (ref 3.5–5.1)
POTASSIUM SERPL-SCNC: 3.9 MMOL/L (ref 3.5–5.1)
POTASSIUM SERPL-SCNC: 4.3 MMOL/L (ref 3.5–5.1)
PROT SERPL-MCNC: 6.6 G/DL (ref 6.4–8.2)
PROT SERPL-MCNC: 6.9 G/DL (ref 6.4–8.2)
PROT SERPL-MCNC: 6.9 G/DL (ref 6.4–8.2)
RBC # BLD AUTO: 4.53 M/UL (ref 4.2–5.9)
RBC # BLD AUTO: 4.69 M/UL (ref 4.2–5.9)
SODIUM SERPL-SCNC: 139 MMOL/L (ref 136–145)
TROPONIN, HIGH SENSITIVITY: 8 NG/L (ref 0–22)
TROPONIN, HIGH SENSITIVITY: 9 NG/L (ref 0–22)
WBC # BLD AUTO: 14.1 K/UL (ref 4–11)
WBC # BLD AUTO: 14.8 K/UL (ref 4–11)

## 2024-06-10 PROCEDURE — 93458 L HRT ARTERY/VENTRICLE ANGIO: CPT | Performed by: INTERNAL MEDICINE

## 2024-06-10 PROCEDURE — 85025 COMPLETE CBC W/AUTO DIFF WBC: CPT

## 2024-06-10 PROCEDURE — 96365 THER/PROPH/DIAG IV INF INIT: CPT

## 2024-06-10 PROCEDURE — G0378 HOSPITAL OBSERVATION PER HR: HCPCS

## 2024-06-10 PROCEDURE — 83735 ASSAY OF MAGNESIUM: CPT

## 2024-06-10 PROCEDURE — 2500000003 HC RX 250 WO HCPCS: Performed by: EMERGENCY MEDICINE

## 2024-06-10 PROCEDURE — 6370000000 HC RX 637 (ALT 250 FOR IP): Performed by: EMERGENCY MEDICINE

## 2024-06-10 PROCEDURE — C1769 GUIDE WIRE: HCPCS | Performed by: INTERNAL MEDICINE

## 2024-06-10 PROCEDURE — 2500000003 HC RX 250 WO HCPCS: Performed by: INTERNAL MEDICINE

## 2024-06-10 PROCEDURE — 6360000002 HC RX W HCPCS: Performed by: EMERGENCY MEDICINE

## 2024-06-10 PROCEDURE — 96375 TX/PRO/DX INJ NEW DRUG ADDON: CPT

## 2024-06-10 PROCEDURE — 2500000003 HC RX 250 WO HCPCS

## 2024-06-10 PROCEDURE — 80053 COMPREHEN METABOLIC PANEL: CPT

## 2024-06-10 PROCEDURE — 99152 MOD SED SAME PHYS/QHP 5/>YRS: CPT | Performed by: INTERNAL MEDICINE

## 2024-06-10 PROCEDURE — 96374 THER/PROPH/DIAG INJ IV PUSH: CPT

## 2024-06-10 PROCEDURE — 84484 ASSAY OF TROPONIN QUANT: CPT

## 2024-06-10 PROCEDURE — 6360000002 HC RX W HCPCS

## 2024-06-10 PROCEDURE — 6370000000 HC RX 637 (ALT 250 FOR IP): Performed by: STUDENT IN AN ORGANIZED HEALTH CARE EDUCATION/TRAINING PROGRAM

## 2024-06-10 PROCEDURE — 2580000003 HC RX 258: Performed by: EMERGENCY MEDICINE

## 2024-06-10 PROCEDURE — 93005 ELECTROCARDIOGRAM TRACING: CPT | Performed by: EMERGENCY MEDICINE

## 2024-06-10 PROCEDURE — 99222 1ST HOSP IP/OBS MODERATE 55: CPT | Performed by: INTERNAL MEDICINE

## 2024-06-10 PROCEDURE — C1894 INTRO/SHEATH, NON-LASER: HCPCS | Performed by: INTERNAL MEDICINE

## 2024-06-10 PROCEDURE — 6360000002 HC RX W HCPCS: Performed by: INTERNAL MEDICINE

## 2024-06-10 PROCEDURE — 85379 FIBRIN DEGRADATION QUANT: CPT

## 2024-06-10 PROCEDURE — 83880 ASSAY OF NATRIURETIC PEPTIDE: CPT

## 2024-06-10 PROCEDURE — 6360000004 HC RX CONTRAST MEDICATION

## 2024-06-10 PROCEDURE — 93010 ELECTROCARDIOGRAM REPORT: CPT | Performed by: INTERNAL MEDICINE

## 2024-06-10 PROCEDURE — 6360000004 HC RX CONTRAST MEDICATION: Performed by: INTERNAL MEDICINE

## 2024-06-10 PROCEDURE — 85730 THROMBOPLASTIN TIME PARTIAL: CPT

## 2024-06-10 PROCEDURE — 99285 EMERGENCY DEPT VISIT HI MDM: CPT

## 2024-06-10 PROCEDURE — 71045 X-RAY EXAM CHEST 1 VIEW: CPT

## 2024-06-10 PROCEDURE — 2580000003 HC RX 258: Performed by: STUDENT IN AN ORGANIZED HEALTH CARE EDUCATION/TRAINING PROGRAM

## 2024-06-10 PROCEDURE — 74174 CTA ABD&PLVS W/CONTRAST: CPT

## 2024-06-10 PROCEDURE — 6370000000 HC RX 637 (ALT 250 FOR IP): Performed by: INTERNAL MEDICINE

## 2024-06-10 PROCEDURE — 36415 COLL VENOUS BLD VENIPUNCTURE: CPT

## 2024-06-10 PROCEDURE — 2709999900 HC NON-CHARGEABLE SUPPLY: Performed by: INTERNAL MEDICINE

## 2024-06-10 PROCEDURE — 6360000004 HC RX CONTRAST MEDICATION: Performed by: EMERGENCY MEDICINE

## 2024-06-10 RX ORDER — ONDANSETRON 2 MG/ML
4 INJECTION INTRAMUSCULAR; INTRAVENOUS EVERY 6 HOURS PRN
Status: CANCELLED | OUTPATIENT
Start: 2024-06-10

## 2024-06-10 RX ORDER — SODIUM CHLORIDE 0.9 % (FLUSH) 0.9 %
5-40 SYRINGE (ML) INJECTION PRN
Status: CANCELLED | OUTPATIENT
Start: 2024-06-10

## 2024-06-10 RX ORDER — SODIUM CHLORIDE 9 MG/ML
INJECTION, SOLUTION INTRAVENOUS PRN
Status: DISCONTINUED | OUTPATIENT
Start: 2024-06-10 | End: 2024-06-10 | Stop reason: SDUPTHER

## 2024-06-10 RX ORDER — MORPHINE SULFATE 2 MG/ML
2 INJECTION, SOLUTION INTRAMUSCULAR; INTRAVENOUS ONCE
Status: COMPLETED | OUTPATIENT
Start: 2024-06-10 | End: 2024-06-10

## 2024-06-10 RX ORDER — CLOPIDOGREL BISULFATE 75 MG/1
75 TABLET ORAL DAILY
Qty: 30 TABLET | Refills: 3
Start: 2024-06-11

## 2024-06-10 RX ORDER — HEPARIN SODIUM 1000 [USP'U]/ML
4000 INJECTION, SOLUTION INTRAVENOUS; SUBCUTANEOUS PRN
Status: DISCONTINUED | OUTPATIENT
Start: 2024-06-10 | End: 2024-06-10 | Stop reason: HOSPADM

## 2024-06-10 RX ORDER — ACETAMINOPHEN 325 MG/1
650 TABLET ORAL EVERY 6 HOURS PRN
Status: DISCONTINUED | OUTPATIENT
Start: 2024-06-10 | End: 2024-06-10 | Stop reason: SDUPTHER

## 2024-06-10 RX ORDER — ONDANSETRON 4 MG/1
4 TABLET, ORALLY DISINTEGRATING ORAL EVERY 8 HOURS PRN
Status: DISCONTINUED | OUTPATIENT
Start: 2024-06-10 | End: 2024-06-10 | Stop reason: HOSPADM

## 2024-06-10 RX ORDER — ONDANSETRON 2 MG/ML
4 INJECTION INTRAMUSCULAR; INTRAVENOUS EVERY 6 HOURS PRN
Status: DISCONTINUED | OUTPATIENT
Start: 2024-06-10 | End: 2024-06-10 | Stop reason: HOSPADM

## 2024-06-10 RX ORDER — POLYETHYLENE GLYCOL 3350 17 G/17G
17 POWDER, FOR SOLUTION ORAL DAILY PRN
Status: DISCONTINUED | OUTPATIENT
Start: 2024-06-10 | End: 2024-06-10 | Stop reason: HOSPADM

## 2024-06-10 RX ORDER — HEPARIN SODIUM 1000 [USP'U]/ML
4000 INJECTION, SOLUTION INTRAVENOUS; SUBCUTANEOUS ONCE
Status: COMPLETED | OUTPATIENT
Start: 2024-06-10 | End: 2024-06-10

## 2024-06-10 RX ORDER — MAGNESIUM SULFATE IN WATER 40 MG/ML
2000 INJECTION, SOLUTION INTRAVENOUS PRN
Status: DISCONTINUED | OUTPATIENT
Start: 2024-06-10 | End: 2024-06-10 | Stop reason: HOSPADM

## 2024-06-10 RX ORDER — ATORVASTATIN CALCIUM 40 MG/1
40 TABLET, FILM COATED ORAL DAILY
Status: DISCONTINUED | OUTPATIENT
Start: 2024-06-10 | End: 2024-06-10 | Stop reason: HOSPADM

## 2024-06-10 RX ORDER — SODIUM CHLORIDE 0.9 % (FLUSH) 0.9 %
5-40 SYRINGE (ML) INJECTION PRN
Status: DISCONTINUED | OUTPATIENT
Start: 2024-06-10 | End: 2024-06-10 | Stop reason: SDUPTHER

## 2024-06-10 RX ORDER — AMIODARONE HYDROCHLORIDE 200 MG/1
200 TABLET ORAL DAILY
Status: DISCONTINUED | OUTPATIENT
Start: 2024-06-10 | End: 2024-06-10 | Stop reason: HOSPADM

## 2024-06-10 RX ORDER — SODIUM CHLORIDE 9 MG/ML
INJECTION, SOLUTION INTRAVENOUS PRN
Status: DISCONTINUED | OUTPATIENT
Start: 2024-06-10 | End: 2024-06-10 | Stop reason: HOSPADM

## 2024-06-10 RX ORDER — HEPARIN SODIUM 10000 [USP'U]/100ML
1340 INJECTION, SOLUTION INTRAVENOUS CONTINUOUS
Status: DISCONTINUED | OUTPATIENT
Start: 2024-06-10 | End: 2024-06-10

## 2024-06-10 RX ORDER — CLOPIDOGREL BISULFATE 75 MG/1
75 TABLET ORAL DAILY
Status: DISCONTINUED | OUTPATIENT
Start: 2024-06-10 | End: 2024-06-10 | Stop reason: HOSPADM

## 2024-06-10 RX ORDER — HEPARIN SODIUM 1000 [USP'U]/ML
2000 INJECTION, SOLUTION INTRAVENOUS; SUBCUTANEOUS PRN
Status: DISCONTINUED | OUTPATIENT
Start: 2024-06-10 | End: 2024-06-10 | Stop reason: HOSPADM

## 2024-06-10 RX ORDER — ASPIRIN 325 MG
325 TABLET ORAL ONCE
Status: CANCELLED | OUTPATIENT
Start: 2024-06-10 | End: 2024-06-10

## 2024-06-10 RX ORDER — METOPROLOL SUCCINATE 25 MG/1
12.5 TABLET, EXTENDED RELEASE ORAL DAILY
Status: DISCONTINUED | OUTPATIENT
Start: 2024-06-10 | End: 2024-06-10 | Stop reason: HOSPADM

## 2024-06-10 RX ORDER — LISINOPRIL 2.5 MG/1
2.5 TABLET ORAL EVERY EVENING
Status: DISCONTINUED | OUTPATIENT
Start: 2024-06-10 | End: 2024-06-10 | Stop reason: HOSPADM

## 2024-06-10 RX ORDER — POTASSIUM CHLORIDE 20 MEQ/1
40 TABLET, EXTENDED RELEASE ORAL PRN
Status: DISCONTINUED | OUTPATIENT
Start: 2024-06-10 | End: 2024-06-10 | Stop reason: HOSPADM

## 2024-06-10 RX ORDER — POTASSIUM CHLORIDE 7.45 MG/ML
10 INJECTION INTRAVENOUS ONCE
Status: COMPLETED | OUTPATIENT
Start: 2024-06-10 | End: 2024-06-10

## 2024-06-10 RX ORDER — SODIUM CHLORIDE 0.9 % (FLUSH) 0.9 %
5-40 SYRINGE (ML) INJECTION EVERY 12 HOURS SCHEDULED
Status: DISCONTINUED | OUTPATIENT
Start: 2024-06-10 | End: 2024-06-10 | Stop reason: SDUPTHER

## 2024-06-10 RX ORDER — SODIUM CHLORIDE 0.9 % (FLUSH) 0.9 %
5-40 SYRINGE (ML) INJECTION EVERY 12 HOURS SCHEDULED
Status: CANCELLED | OUTPATIENT
Start: 2024-06-10

## 2024-06-10 RX ORDER — LORAZEPAM 0.5 MG/1
0.5 TABLET ORAL
Status: CANCELLED | OUTPATIENT
Start: 2024-06-10 | End: 2024-06-11

## 2024-06-10 RX ORDER — DROPERIDOL 2.5 MG/ML
0.62 INJECTION, SOLUTION INTRAMUSCULAR; INTRAVENOUS ONCE
Status: COMPLETED | OUTPATIENT
Start: 2024-06-10 | End: 2024-06-10

## 2024-06-10 RX ORDER — HEPARIN SODIUM 1000 [USP'U]/ML
INJECTION, SOLUTION INTRAVENOUS; SUBCUTANEOUS PRN
Status: DISCONTINUED | OUTPATIENT
Start: 2024-06-10 | End: 2024-06-10 | Stop reason: HOSPADM

## 2024-06-10 RX ORDER — METOCLOPRAMIDE HYDROCHLORIDE 5 MG/ML
10 INJECTION INTRAMUSCULAR; INTRAVENOUS ONCE
Status: COMPLETED | OUTPATIENT
Start: 2024-06-10 | End: 2024-06-10

## 2024-06-10 RX ORDER — SODIUM CHLORIDE 9 MG/ML
INJECTION, SOLUTION INTRAVENOUS PRN
Status: CANCELLED | OUTPATIENT
Start: 2024-06-10

## 2024-06-10 RX ORDER — SODIUM CHLORIDE 0.9 % (FLUSH) 0.9 %
5-40 SYRINGE (ML) INJECTION EVERY 12 HOURS SCHEDULED
Status: DISCONTINUED | OUTPATIENT
Start: 2024-06-10 | End: 2024-06-10 | Stop reason: HOSPADM

## 2024-06-10 RX ORDER — POTASSIUM CHLORIDE 7.45 MG/ML
10 INJECTION INTRAVENOUS PRN
Status: DISCONTINUED | OUTPATIENT
Start: 2024-06-10 | End: 2024-06-10 | Stop reason: HOSPADM

## 2024-06-10 RX ORDER — FENTANYL CITRATE 50 UG/ML
INJECTION, SOLUTION INTRAMUSCULAR; INTRAVENOUS PRN
Status: DISCONTINUED | OUTPATIENT
Start: 2024-06-10 | End: 2024-06-10 | Stop reason: HOSPADM

## 2024-06-10 RX ORDER — MAGNESIUM SULFATE 1 G/100ML
1000 INJECTION INTRAVENOUS ONCE
Status: COMPLETED | OUTPATIENT
Start: 2024-06-10 | End: 2024-06-10

## 2024-06-10 RX ORDER — MIDAZOLAM HYDROCHLORIDE 1 MG/ML
INJECTION INTRAMUSCULAR; INTRAVENOUS PRN
Status: DISCONTINUED | OUTPATIENT
Start: 2024-06-10 | End: 2024-06-10 | Stop reason: HOSPADM

## 2024-06-10 RX ORDER — ACETAMINOPHEN 650 MG/1
650 SUPPOSITORY RECTAL EVERY 6 HOURS PRN
Status: DISCONTINUED | OUTPATIENT
Start: 2024-06-10 | End: 2024-06-10 | Stop reason: HOSPADM

## 2024-06-10 RX ORDER — SODIUM CHLORIDE 0.9 % (FLUSH) 0.9 %
5-40 SYRINGE (ML) INJECTION PRN
Status: DISCONTINUED | OUTPATIENT
Start: 2024-06-10 | End: 2024-06-10 | Stop reason: HOSPADM

## 2024-06-10 RX ORDER — ASPIRIN 81 MG/1
324 TABLET, CHEWABLE ORAL ONCE
Status: COMPLETED | OUTPATIENT
Start: 2024-06-10 | End: 2024-06-10

## 2024-06-10 RX ORDER — ACETAMINOPHEN 325 MG/1
650 TABLET ORAL EVERY 4 HOURS PRN
Status: DISCONTINUED | OUTPATIENT
Start: 2024-06-10 | End: 2024-06-10 | Stop reason: HOSPADM

## 2024-06-10 RX ORDER — HEPARIN SODIUM 1000 [USP'U]/ML
4000 INJECTION, SOLUTION INTRAVENOUS; SUBCUTANEOUS ONCE
Status: DISCONTINUED | OUTPATIENT
Start: 2024-06-10 | End: 2024-06-10

## 2024-06-10 RX ADMIN — CLOPIDOGREL BISULFATE 75 MG: 75 TABLET ORAL at 17:05

## 2024-06-10 RX ADMIN — HEPARIN SODIUM AND DEXTROSE 1000 UNITS/HR: 10000; 5 INJECTION INTRAVENOUS at 04:55

## 2024-06-10 RX ADMIN — Medication 10 ML: at 10:31

## 2024-06-10 RX ADMIN — DROPERIDOL 0.62 MG: 2.5 INJECTION, SOLUTION INTRAMUSCULAR; INTRAVENOUS at 02:41

## 2024-06-10 RX ADMIN — MORPHINE SULFATE 2 MG: 2 INJECTION, SOLUTION INTRAMUSCULAR; INTRAVENOUS at 02:04

## 2024-06-10 RX ADMIN — METOCLOPRAMIDE HYDROCHLORIDE 10 MG: 5 INJECTION INTRAMUSCULAR; INTRAVENOUS at 02:06

## 2024-06-10 RX ADMIN — MAGNESIUM SULFATE HEPTAHYDRATE 1000 MG: 1 INJECTION, SOLUTION INTRAVENOUS at 03:12

## 2024-06-10 RX ADMIN — METOPROLOL SUCCINATE 12.5 MG: 25 TABLET, EXTENDED RELEASE ORAL at 10:31

## 2024-06-10 RX ADMIN — LISINOPRIL 2.5 MG: 2.5 TABLET ORAL at 17:05

## 2024-06-10 RX ADMIN — Medication 20 MG: at 02:23

## 2024-06-10 RX ADMIN — HEPARIN SODIUM 4000 UNITS: 1000 INJECTION INTRAVENOUS; SUBCUTANEOUS at 04:55

## 2024-06-10 RX ADMIN — POTASSIUM CHLORIDE 10 MEQ: 7.46 INJECTION, SOLUTION INTRAVENOUS at 03:12

## 2024-06-10 RX ADMIN — ASPIRIN 81 MG 324 MG: 81 TABLET ORAL at 04:52

## 2024-06-10 RX ADMIN — IOPAMIDOL 75 ML: 755 INJECTION, SOLUTION INTRAVENOUS at 02:56

## 2024-06-10 RX ADMIN — NITROGLYCERIN 0.5 INCH: 20 OINTMENT TOPICAL at 02:20

## 2024-06-10 ASSESSMENT — PAIN DESCRIPTION - ORIENTATION
ORIENTATION: MID
ORIENTATION: UPPER

## 2024-06-10 ASSESSMENT — PAIN DESCRIPTION - DIRECTION: RADIATING_TOWARDS: BACK

## 2024-06-10 ASSESSMENT — LIFESTYLE VARIABLES
HOW MANY STANDARD DRINKS CONTAINING ALCOHOL DO YOU HAVE ON A TYPICAL DAY: 1 OR 2
HOW OFTEN DO YOU HAVE A DRINK CONTAINING ALCOHOL: MONTHLY OR LESS

## 2024-06-10 ASSESSMENT — PAIN DESCRIPTION - DESCRIPTORS
DESCRIPTORS: ACHING
DESCRIPTORS: ACHING

## 2024-06-10 ASSESSMENT — PAIN DESCRIPTION - FREQUENCY: FREQUENCY: CONTINUOUS

## 2024-06-10 ASSESSMENT — PAIN DESCRIPTION - LOCATION
LOCATION: CHEST
LOCATION: ABDOMEN

## 2024-06-10 ASSESSMENT — PAIN - FUNCTIONAL ASSESSMENT
PAIN_FUNCTIONAL_ASSESSMENT: 0-10
PAIN_FUNCTIONAL_ASSESSMENT: ACTIVITIES ARE NOT PREVENTED

## 2024-06-10 ASSESSMENT — PAIN SCALES - GENERAL
PAINLEVEL_OUTOF10: 3
PAINLEVEL_OUTOF10: 8
PAINLEVEL_OUTOF10: 5

## 2024-06-10 ASSESSMENT — PAIN DESCRIPTION - PAIN TYPE
TYPE: ACUTE PAIN
TYPE: ACUTE PAIN

## 2024-06-10 ASSESSMENT — PAIN DESCRIPTION - ONSET: ONSET: ON-GOING

## 2024-06-10 NOTE — SEDATION DOCUMENTATION
Brief Pre-Op Note/Sedation Assessment      Jose Hurst  1965  [id number]  2:50 PM    Planned Procedure: Cardiac Catheterization Procedure  Post Procedure Plan: Return to same level of care  Consent: I have discussed with the patient and/or the patient representative the indication, alternatives, and the possible risks and/or complications of the planned procedure and the anesthesia methods. The patient and/or patient representative appear to understand and agree to proceed.      Chief Complaint:   Unstable angina    Indications for Cath Procedure:  Presentation:  Worsening Angina  2.  Anginal Classification within 2 weeks:  CCS IV - Inability to perform any activity without angina or angina at rest, i.e., severe limitation  3.  Angina Symptoms Assessment:  Atypical Chest Pain  4.  Heart Failure Class within last 2 weeks:  No symptoms  5.  Cardiovascular Instability:  No    Prior Ischemic Workup/Eval:  Pre-Procedural Medications: Yes: ACE/ARB/ARNI, Aspirin, Beta Blockers, and STATIN  2.   Stress Test Completed?  No    Does Patient need surgery?  Cath Valve Surgery:  No    Pre-Procedure Medical History:  Vital Signs:  /71   Pulse 92   Temp 98.4 °F (36.9 °C) (Oral)   Resp 16   Ht 1.829 m (6')   Wt 84.4 kg (186 lb)   SpO2 92%   BMI 25.23 kg/m²     Allergies:  No Known Allergies  Medications:    Current Facility-Administered Medications   Medication Dose Route Frequency Provider Last Rate Last Admin    heparin (porcine) injection 4,000 Units  4,000 Units IntraVENous PRN Jane Mullen MD        heparin (porcine) injection 2,000 Units  2,000 Units IntraVENous PRN Jane Mullen MD        sodium chloride flush 0.9 % injection 5-40 mL  5-40 mL IntraVENous 2 times per day Etsela Salvador, DO   10 mL at 06/10/24 1031    sodium chloride flush 0.9 % injection 5-40 mL  5-40 mL IntraVENous PRN Jostin Salvadorirtommy, DO        0.9 % sodium chloride infusion   IntraVENous PRN Estela Salvador, DO        potassium

## 2024-06-10 NOTE — H&P
the chest, abdomen and pelvis was performed after the administration of intravenous contrast.  Multiplanar reformatted images are provided for review.  MIP images are provided for review. Automated exposure control, iterative reconstruction, and/or weight based adjustment of the mA/kV was utilized to reduce the radiation dose to as low as reasonably achievable. COMPARISON: None. HISTORY: ORDERING SYSTEM PROVIDED HISTORY: left sided chest pain radiating to back TECHNOLOGIST PROVIDED HISTORY: Reason for exam:->left sided chest pain radiating to back Additional Contrast?->1 Reason for Exam: Left sided chest pain radiating to back; abdomen pain Relevant Medical/Surgical History: pt states he has a hx of a -maker heart attack x 3 years ago, but did not have surgery or stents placed FINDINGS: CTA CHEST: Thoracic aorta: No evidence of thoracic aortic aneurysm or dissection.  No acute abnormality of the aorta. Mediastinum: No evidence of mediastinal lymphadenopathy.  The heart and pericardium demonstrate no acute abnormality. Lungs/Pleura: The lungs are without acute process.  No focal consolidation or pulmonary edema.  No evidence of pleural effusion or pneumothorax. Soft Tissues/Bones: No acute bone or soft tissue abnormality. CTA ABDOMEN: Lower Chest:  Visualized portion of the lower chest demonstrates no acute abnormality. Organs:  Liver demonstrates no suspicious mass. There is no evidence of intrahepatic biliary ductal dilatation. The spleen, pancreas and adrenal glands are unremarkable. The kidneys demonstrate no evidence of hydronephrosis. GI/Bowel:  There is no evidence of bowel obstruction.  No evidence of abnormal bowel wall thickening or distension. CTA PELVIS: Aorta/Iliacs: Normal Pelvis:  No acute abnormality of the pelvis organs or bladder. Peritoneum/Retroperitoneum: No evidence of ascites or free air. Bones/Soft Tissues: Unremarkable     1. No evidence of thoracic or abdominal aortic aneurysm or

## 2024-06-10 NOTE — PLAN OF CARE
Problem: Pain  Goal: Verbalizes/displays adequate comfort level or baseline comfort level  Outcome: Progressing   Numeric pain rating scale being used. Patient repositioned for comfort. 3/10 upper abdominal pain, educated on reporting changes in pain and to notify RN.       Problem: Safety - Adult  Goal: Free from fall injury  Outcome: Progressing   Patient has remained free of falls. 2/4 bed rails up, bed locked and in lowest position, call light within reach. Patient instructed on use of call light and uses appropriately. Bed alarm on. Non-skid footwear and fall band on.

## 2024-06-10 NOTE — CARE COORDINATION
Case Management Assessment  Initial Evaluation    Date/Time of Evaluation: 6/10/2024 4:09 PM  Assessment Completed by: Sue Dacosta RN    If patient is discharged prior to next notation, then this note serves as note for discharge by case management.    Patient Name: Jose Hurst                   YOB: 1965  Diagnosis: Hypokalemia [E87.6]  Hypomagnesemia [E83.42]  Chest pain [R07.9]  Intractable nausea and vomiting [R11.2]  Chest pain, unspecified type [R07.9]                   Date / Time: 6/10/2024  1:53 AM    Patient Admission Status: Observation   Readmission Risk (Low < 19, Mod (19-27), High > 27): No data recorded  Current PCP: Alphonse Molina MD  PCP verified by CM?      Chart Reviewed: Yes      History Provided by:    Patient Orientation:      Patient Cognition:      Hospitalization in the last 30 days (Readmission):  No    If yes, Readmission Assessment in CM Navigator will be completed.    Advance Directives:      Code Status: Full Code   Patient's Primary Decision Maker is:      Primary Decision Maker: Luanne Huston - Domestic Partner - [phone]    Secondary Decision Maker: Ziggy Hurst - Parent - [phone]    Discharge Planning:    Patient lives with: Alone Type of Home: House  Primary Care Giver:    Patient Support Systems include: Family Members   Current Financial resources:    Current community resources:    Current services prior to admission: None            Current DME:              Type of Home Care services:  None    ADLS  Prior functional level:    Current functional level:      PT AM-PAC:   /24  OT AM-PAC:   /24    Family can provide assistance at DC:    Would you like Case Management to discuss the discharge plan with any other family members/significant others, and if so, who?    Plans to Return to Present Housing:    Other Identified Issues/Barriers to RETURNING to current housing:   Potential Assistance needed at discharge: N/A            Potential DME:

## 2024-06-10 NOTE — PROGRESS NOTES
Patient admitted to unit, VSS. Alert and oriented x4. NSR on tele. Complains of 3/10 upper abdominal pain. Abdomen is soft, bowel sounds active. Given NPO instructions. Heparin gtt running. Continued nausea at this time. Fall precautions in place.

## 2024-06-10 NOTE — CONSULTS
Consult Placed     Who: CARDIOLOGY, [facility]   Date:6/10/2024  Time:0801     Electronically signed by Alexey Ruiz on 6/10/2024 at 8:01 AM

## 2024-06-10 NOTE — ED NOTES
TRANSFER - OUT REPORT:    Verbal report given to B3 RN on Jose Hurst  being transferred to  for routine progression of patient care       Report consisted of patient's Situation, Background, Assessment and   Recommendations(SBAR).     Information from the following report(s) Nurse Handoff Report, Index, ED Encounter Summary, ED SBAR, Adult Overview, Intake/Output, MAR, Recent Results, Cardiac Rhythm Afib/SR/SA, and Neuro Assessment was reviewed with the receiving nurse.    [place] Fall Assessment:    Presents to emergency department  because of falls (Syncope, seizure, or loss of consciousness): No  Age > 70: No  Altered Mental Status, Intoxication with alcohol or substance confusion (Disorientation, impaired judgment, poor safety awaremess, or inability to follow instructions): No  Impaired Mobility: Ambulates or transfers with assistive devices or assistance; Unable to ambulate or transer.: No  Nursing Judgement: No          Lines:   Peripheral IV 06/10/24 Left Antecubital (Active)       Peripheral IV 06/10/24 Right Forearm (Active)        Opportunity for questions and clarification was provided.      Patient transported with:  Monitor, O2 @ 2lpm, and Registered Nurse

## 2024-06-10 NOTE — PROGRESS NOTES
Pt ok for discharge per MD. Discharge instructions reviewed with pt and pt sister. IV removed without complications bandage applied. No questions or concerns at this time. Pt left and instructed that pt should not be driving for 24 hours.

## 2024-06-10 NOTE — ACP (ADVANCE CARE PLANNING)
Advance Care Planning     General Advance Care Planning (ACP) Conversation    Date of Conversation: 6/10/2024  Conducted with: Patient with Decision Making Capacity  Other persons present: None    Healthcare Decision Maker:    Primary Decision Maker: Luanne Huston - Domestic Partner - [phone]    Secondary Decision Maker: Ziggy Hurst - Parent - [phone]  Click here to complete Healthcare Decision Makers including selection of the Healthcare Decision Maker Relationship (ie \"Primary\").  Today we documented Decision Maker(s) consistent with Legal Next of Kin hierarchy.    Content/Action Overview:  Has NO ACP documents-Information provided  Reviewed DNR/DNI and patient elects Full Code (Attempt Resuscitation)      Length of Voluntary ACP Conversation in minutes:  <16 minutes (Non-Billable)    Sue Dacosta RN

## 2024-06-10 NOTE — ED NOTES
Code STEMI called @ 0200   Placed a call to interventional cardiology @ 0200   RE: code STEMI per MD Dr. Natalie Mullen called back @ 0202      Cath lab not to be called in

## 2024-06-10 NOTE — DISCHARGE SUMMARY
Lipids: No results found for: \"CHOL\", \"HDL\", \"TRIG\"  Hemoglobin A1C: No results found for: \"LABA1C\"  TSH: No results found for: \"TSH\"  Troponin: No results found for: \"TROPONINT\"  Lactic Acid: No results for input(s): \"LACTA\" in the last 72 hours.  BNP:   Recent Labs     06/10/24  0202   PROBNP 362*     UA:No results found for: \"NITRU\", \"COLORU\", \"PHUR\", \"LABCAST\", \"WBCUA\", \"RBCUA\", \"MUCUS\", \"TRICHOMONAS\", \"YEAST\", \"BACTERIA\", \"CLARITYU\", \"SPECGRAV\", \"LEUKOCYTESUR\", \"UROBILINOGEN\", \"BILIRUBINUR\", \"BLOODU\", \"GLUCOSEU\", \"KETUA\", \"AMORPHOUS\"  Urine Cultures: No results found for: \"LABURIN\"  Blood Cultures:   Lab Results   Component Value Date/Time    BC No Growth after 4 days of incubation. 12/19/2021 12:19 AM     Lab Results   Component Value Date/Time    BLOODCULT2 No Growth after 4 days of incubation. 12/19/2021 04:03 AM     Organism: No results found for: \"ORG\"      The patient expressed appropriate understanding of, and agreement with the discharge recommendations, medications, and plan.     Full Code    Discharge condition: stable    Consults this admission:  IP CONSULT TO HOSPITALIST  IP CONSULT TO CARDIOLOGY    Time Spent Discharging patient 33 minutes    Electronically signed by DAVID PEARSON MD on 6/10/2024 at 6:05 PM

## 2024-06-10 NOTE — CONSULTS
Pharmacy to Manage Heparin Infusion per Hospital Nomogram    Dx: Chest Pain  Pt wt = 84.4 kg  Baseline aPTT =     Oral factor Xa-inhibitors may alter and elevate anti-Xa levels used for unfractionated heparin monitoring. As a result, anti-Xa monitoring is not accurate while Xa-inhibitor activity is detectable. Utilize aPTT monitoring when patient received an oral factor Xa-inhibitor (apixaban, betrixaban, edoxaban or rivaroxaban) within 72 hours prior to admission (please document last administration time). The goal is to allow a washout of oral factor Xa-inhibitors by using aPTT for 72 hours, then change to ant-Xa levels for UFH.       Heparin (weight-based) Infusion: CAD/STEMI/NSTEMI/UA/AFib)   Heparin 60 units/kg IVP bolus (max 4,000 units) followed by Heparin infusion at 12 units/kg/hr (recommended max initial rate: 1000 units/hr).  Recheck anti-Xa (unless aPTT being used) in 6 hours.  Goal anti-Xa 0.3-0.7 IU/mL  Goal aPTT =  seconds.  Dustin Breen, Pharm D.6/10/2024 4:45 AM    Pt taking Xarelto at home.  Unsure of last dose.  Will follow aptt for 72 hrs.  Dustin Breen Pharm D.6/10/2024 4:48 AM

## 2024-06-10 NOTE — PROCEDURES
CARDIAC CATHETERIZATION REPORT    Date of Procedure: 6/10/2024  : Reese Estrada DO  Primary Indication: Unstable angina    Procedures Performed:  1. Coronary angiography  2. Left heart catheterization  3. Left ventriculography  4. Moderate conscious sedation    Procedural Details:  Access: Local anesthetic was given and access was obtained in the right radial artery using a micropuncture technique and a 6F Terumo Slender Sheath was placed without difficulty.  Diagnostic: A 5F TIG catheter was used to perform selective right and left coronary angiography.  A 5F pigtail catheter was used to perform the left heart catheterization and left ventriculography.  No significant gradient was observed on pull-back of the catheter across the aortic valve.    Hemostasis: At the end of the procedure, the radial sheath was removed and a hemoband was placed over the arteriotomy site and filled with air to maintain hemostasis.    Findings:  Hemodynamics:     A. Opening arterial pressure: 105/63 (83) mmHg      B. LVEDP: 14 mmHg    2.  Coronary anatomy:  A. Left main artery: The left main artery bifurcates into the left anterior descending artery and left circumflex artery.  The left main artery has a 10% ostial/proximal stenosis.  B. Left anterior descending artery: Transapical vessel which gives rise to one large diagonal artery.  The LAD has a 30-40% proximal/mid-vessel stenosis.  The remainder of the vessel has minor luminal irregularities.  The apical LAD was previously noted to be occluded with thrombus at the time of the patient's prior MI, but now appears to be patent.  The diagonal artery is a large, branching vessel with minor luminal irregularities.  C. Left circumflex artery: Non-dominant vessel that gives rise to 3 obtuse marginal arteries.  The LCx has a 20-30% proximal stenosis.  The OM1 is very small vessel with a high origin and has mild disease.  The OM2 is a medium caliber vessel with minor luminal

## 2024-06-10 NOTE — PROGRESS NOTES
4 Eyes Skin Assessment     NAME:  Jose Hurst  YOB: 1965  MEDICAL RECORD NUMBER:  [id number]    The patient is being assessed for  Admission    I agree that at least one RN has performed a thorough Head to Toe Skin Assessment on the patient. ALL assessment sites listed below have been assessed.      Areas assessed by both nurses:    Head, Face, Ears, Shoulders, Back, Chest, Arms, Elbows, Hands, Sacrum. Buttock, Coccyx, Ischium, Legs. Feet and Heels, and Under Medical Devices         Does the Patient have a Wound? No noted wound(s)       Naseem Prevention initiated by RN: No  Wound Care Orders initiated by RN: No    Pressure Injury (Stage 3,4, Unstageable, DTI, NWPT, and Complex wounds) if present, place Wound referral order by RN under : No    New Ostomies, if present place, Ostomy referral order under : No     Nurse 1 eSignature: Electronically signed by Pamela Esposito RN on 6/10/24 at 5:51 AM EDT    **SHARE this note so that the co-signing nurse can place an eSignature**    Nurse 2 eSignature: {Esignature:[id number]}

## 2024-06-10 NOTE — PROGRESS NOTES
Pt states no longer takes amiodarone or brilinta and has not for a few years, he was taken off by his MD. Pt also states that per his MD to only take lasix as needed and has not had in last few weeks due to no swelling or sob.

## 2024-06-10 NOTE — CONSULTS
CARDIOLOGY CONSULTATION        Patient Name: Jose Hurst  Date of admission: 6/10/2024  1:53 AM  Admission Dx: Hypokalemia [E87.6]  Hypomagnesemia [E83.42]  Chest pain [R07.9]  Intractable nausea and vomiting [R11.2]  Chest pain, unspecified type [R07.9]  Requesting Physician: Estela Salvador DO  Primary Care physician: Alphonse Molina MD    Reason for Consultation/Chief Complaint: Chest pain and nausea    History of Present Illness:     Jose Hurst is a 58 y.o. man with history of acute anterior STEMI (12/2021) here at [facility] due to thrombus in LM complicated by cardiogenic shock, HFrEF now with EF 35%, former smoker (quit 2007), HTN and HLP admitted for chest pain and nausea.  He now follows routinely with Dr Vijay Fox at [facility].   Etiology of LM thrombus found on [facility] in 2021 was unclear, and patient was placed on Xarelto, ASA, and Brilinta at time of discharge.  Patient attributes his LM thrombus to COVID vaccine he received 10 days prior to presenting with the STEMI.  Per [facility] records, he is positive for factor 5 Leiden mutation but negative for prothrombin mutation, MT HFR mutation.  He underwent CCTA at [facility] in 10/2023 which showed the left main artery was clear with mild CAD seen in remaining vessels.  Echo in 3/2024 at [facility] showed LVEF of 30-35%.    Patient reports he was in his usual state of health until yesterday when he developed significant left sided chest pain and nausea.  He reports symptoms are similar to when he had his heart attack in 2021.  Patient works the night shift as a  and states he had been feeling well over the last six months until his episode of chest pain yesterday.  In ER, his troponin were negative but due to persistent and increasing symptoms he was started on a heparin drip.  Patient reports mild nausea persists but his chest pain symptoms have improved since admission.    Past Medical History:   has a past medical history of CAD (coronary artery disease),

## 2024-07-24 RX ORDER — METOPROLOL SUCCINATE 25 MG/1
TABLET, EXTENDED RELEASE ORAL
Qty: 15 TABLET | Refills: 0 | OUTPATIENT
Start: 2024-07-24

## 2024-09-16 NOTE — ED PROVIDER NOTES
EMERGENCY DEPARTMENT ENCOUNTER     [facility]  ED     Pt Name: Jose Hurst   MRN: [id number]   Birthdate 1965   Date of evaluation: 6/10/2024   Provider: Jane Mullen MD   PCP: Alphonse Molina MD   Note Started: 2:21 AM EDT 6/10/24     CHIEF COMPLAINT:     Chief Complaint   Patient presents with    Chest Pain     Patient reports chest pain started at work, light headed and nausea. SOB difficulty catching breath started 40 minutes ago, reports feeling restless        HISTORY OF PRESENT ILLNESS:    58-year-old male. Presented with chest pain while at work which started about 1 hour ago.  Pain is located to the left chest and radiating to the back.  Prior to the onset of his chest pain he had nausea and vomiting.  No reported abdominal pain. Reports feeling lightheaded. Sensation of legs feeling like they are going to \"blow up\". Experiencing shortness of breath. Reports feeling restless. Reports cramping in feet.  No reported cough, runny nose, or fevers in the past few days. No reported unusual tiredness. No reported shortness of breath or chest pain upon exertion.     PHYSICAL EXAM:    ED Triage Vitals   BP Temp Temp Source Pulse Respirations SpO2 Height Weight - Scale   06/10/24 0157 06/10/24 0214 06/10/24 0214 06/10/24 0157 06/10/24 0157 06/10/24 0157 06/10/24 0158 06/10/24 0158   128/68 98.3 °F (36.8 °C) Oral 90 14 100 % 1.829 m (6') 84.4 kg (186 lb)        Physical Exam  Vitals and nursing note reviewed.   Constitutional:       Appearance: He is well-developed. He is not ill-appearing.      Comments: Uncomfortable but nontoxic-appearing adult male in in acute painful distress.   HENT:      Head: Normocephalic and atraumatic.      Right Ear: External ear normal.      Left Ear: External ear normal.      Nose: Nose normal.   Eyes:      General: No scleral icterus.        Right eye: No discharge.         Left eye: No discharge.      Conjunctiva/sclera: Conjunctivae normal.  Him/He

## (undated) DEVICE — SOLUTION IV HEPARIN SODIUM SODIUM CHL 0.9% 500 ML INJ VIAFLX

## (undated) DEVICE — 260 CM J TIP WIRE .035

## (undated) DEVICE — CATH LAB PACK: Brand: MEDLINE INDUSTRIES, INC.

## (undated) DEVICE — RADIFOCUS OPTITORQUE ANGIOGRAPHIC CATHETER: Brand: OPTITORQUE

## (undated) DEVICE — TR BAND RADIAL ARTERY COMPRESSION DEVICE: Brand: TR BAND

## (undated) DEVICE — GLIDESHEATH SLENDER STAINLESS STEEL KIT: Brand: GLIDESHEATH SLENDER

## (undated) DEVICE — 5FR MEDTRONIC PIG  110CM